# Patient Record
Sex: FEMALE | Race: WHITE | NOT HISPANIC OR LATINO | Employment: FULL TIME | URBAN - METROPOLITAN AREA
[De-identification: names, ages, dates, MRNs, and addresses within clinical notes are randomized per-mention and may not be internally consistent; named-entity substitution may affect disease eponyms.]

---

## 2020-09-30 ENCOUNTER — OFFICE VISIT (OUTPATIENT)
Dept: CARDIOLOGY | Facility: CLINIC | Age: 65
End: 2020-09-30
Payer: COMMERCIAL

## 2020-09-30 VITALS
HEIGHT: 61 IN | BODY MASS INDEX: 28.62 KG/M2 | DIASTOLIC BLOOD PRESSURE: 59 MMHG | HEART RATE: 58 BPM | SYSTOLIC BLOOD PRESSURE: 111 MMHG | WEIGHT: 151.56 LBS

## 2020-09-30 DIAGNOSIS — I49.1 PREMATURE ATRIAL CONTRACTIONS: Primary | ICD-10-CM

## 2020-09-30 DIAGNOSIS — E78.5 DYSLIPIDEMIA: ICD-10-CM

## 2020-09-30 PROCEDURE — 3008F PR BODY MASS INDEX (BMI) DOCUMENTED: ICD-10-PCS | Mod: CPTII,S$GLB,, | Performed by: INTERNAL MEDICINE

## 2020-09-30 PROCEDURE — 3008F BODY MASS INDEX DOCD: CPT | Mod: CPTII,S$GLB,, | Performed by: INTERNAL MEDICINE

## 2020-09-30 PROCEDURE — 99203 OFFICE O/P NEW LOW 30 MIN: CPT | Mod: S$GLB,,, | Performed by: INTERNAL MEDICINE

## 2020-09-30 PROCEDURE — 99999 PR PBB SHADOW E&M-NEW PATIENT-LVL III: ICD-10-PCS | Mod: PBBFAC,,, | Performed by: INTERNAL MEDICINE

## 2020-09-30 PROCEDURE — 99203 PR OFFICE/OUTPT VISIT, NEW, LEVL III, 30-44 MIN: ICD-10-PCS | Mod: S$GLB,,, | Performed by: INTERNAL MEDICINE

## 2020-09-30 PROCEDURE — 99999 PR PBB SHADOW E&M-NEW PATIENT-LVL III: CPT | Mod: PBBFAC,,, | Performed by: INTERNAL MEDICINE

## 2020-09-30 RX ORDER — ATORVASTATIN CALCIUM 20 MG/1
20 TABLET, FILM COATED ORAL DAILY
Qty: 90 TABLET | Refills: 3 | Status: SHIPPED | OUTPATIENT
Start: 2020-09-30 | End: 2021-10-21 | Stop reason: SDUPTHER

## 2020-09-30 RX ORDER — METOPROLOL SUCCINATE 50 MG/1
25 TABLET, EXTENDED RELEASE ORAL DAILY
COMMUNITY
Start: 2020-07-16 | End: 2020-09-30 | Stop reason: SDUPTHER

## 2020-09-30 RX ORDER — METOPROLOL SUCCINATE 50 MG/1
50 TABLET, EXTENDED RELEASE ORAL DAILY
Qty: 90 TABLET | Refills: 3 | Status: SHIPPED | OUTPATIENT
Start: 2020-09-30 | End: 2021-10-21 | Stop reason: SDUPTHER

## 2020-09-30 RX ORDER — ATORVASTATIN CALCIUM 20 MG/1
20 TABLET, FILM COATED ORAL DAILY
COMMUNITY
Start: 2020-07-16 | End: 2020-09-30 | Stop reason: SDUPTHER

## 2020-09-30 RX ORDER — MELATONIN 3 MG
3 CAPSULE ORAL NIGHTLY
COMMUNITY
End: 2022-11-01

## 2020-09-30 NOTE — PROGRESS NOTES
Subjective:   Patient ID:  Teresa Dnenis is a 65 y.o. female who presents for evaluation of Hyperlipidemia      HPI: Very pleasant woman who previously saw Dr. Dia at  for PACs/PVCs and dyslipidemia for about 7 years presents to establish care with [facility].    She is doing well with no new symptoms or cardiovascular complaints and no change in exercise capacity.  She denies chest discomfort, HENLEY, palpitations, PND/orthopnea, lightheadedness and syncope.    Her father had AF and  of CHF.    She only smoked in high school.    Exercise: walks for an hour at 5:30am with a friend 4-5x/week     Past Medical History:   Diagnosis Date    Premature atrial contractions     Seizure disorder        Past Surgical History:   Procedure Laterality Date     SECTION      ENDOMETRIAL ABLATION      HERNIA REPAIR      Tonsillectomy         Social History     Tobacco Use    Smoking status: Former Smoker    Smokeless tobacco: Never Used   Substance Use Topics    Alcohol use: Yes     Comment: Socially    Drug use: No       History reviewed. No pertinent family history.    Current Outpatient Medications   Medication Sig    atorvastatin (LIPITOR) 20 MG tablet Take 20 mg by mouth once daily.    b complex vitamins capsule Take 1 capsule by mouth once daily.      carbamazepine (CARBATROL) 300 MG CM12 Take 300 mg by mouth once daily.     IRON, FERROUS SULFATE, ORAL Take 65 mg by mouth once daily.      melatonin 3 mg Cap Take 3 mg by mouth nightly.    metoprolol succinate (TOPROL-XL) 50 MG 24 hr tablet Take 25 mg by mouth once daily.     No current facility-administered medications for this visit.        Review of patient's allergies indicates:  No Known Allergies    Review of Systems   Constitution: Negative.   HENT: Negative.    Eyes: Negative.    Cardiovascular: Negative.  Negative for chest pain, dyspnea on exertion, near-syncope, orthopnea and palpitations.   Respiratory: Negative.  Negative for  cough and shortness of breath.    Endocrine: Negative.    Hematologic/Lymphatic: Negative.    Skin: Negative.    Musculoskeletal: Negative.    Gastrointestinal: Negative.    Genitourinary: Negative.    Neurological: Negative.    Psychiatric/Behavioral: Negative.      Objective:   Physical Exam   Constitutional: She is oriented to person, place, and time. She appears well-developed and well-nourished.   HENT:   Head: Normocephalic and atraumatic.   Mouth/Throat: Oropharynx is clear and moist.   Eyes: Conjunctivae and EOM are normal. No scleral icterus.   Neck: Normal range of motion. Neck supple. No JVD present.   Cardiovascular: Normal rate, regular rhythm, normal heart sounds and intact distal pulses. Exam reveals no gallop and no friction rub.   No murmur heard.  Pulmonary/Chest: Effort normal and breath sounds normal. She has no wheezes. She has no rales.   Abdominal: Soft. Bowel sounds are normal. She exhibits no distension. There is no abdominal tenderness.   Musculoskeletal: Normal range of motion.         General: No edema.   Neurological: She is alert and oriented to person, place, and time.   Skin: Skin is warm and dry. No rash noted. No erythema.   Psychiatric: She has a normal mood and affect. Her behavior is normal. Judgment and thought content normal.   Vitals reviewed.      No results found for: WBC, HGB, HCT, MCV, PLT      Chemistry    No results found for: NA, K, CL, CO2, BUN, CREATININE, GLU No results found for: CALCIUM, ALKPHOS, AST, ALT, BILITOT, ESTGFRAFRICA, EGFRNONAA         No results found for: CHOL  No results found for: HDL  No results found for: LDLCALC  No results found for: TRIG  No results found for: CHOLHDL    Lab Results   Component Value Date    TSH 1.20 10/04/2010       No results found for: HGBA1C      Assessment:     1. Premature atrial contractions    2. Dyslipidemia        Plan:     Continue current medicines.    Diet/exercise goals reinforced.    Hand washing and social  distancing stressed.    F/U 12 months

## 2020-10-05 ENCOUNTER — RESEARCH ENCOUNTER (OUTPATIENT)
Dept: RESEARCH | Facility: HOSPITAL | Age: 65
End: 2020-10-05

## 2020-10-05 ENCOUNTER — RESEARCH ENCOUNTER (OUTPATIENT)
Dept: RESEARCH | Facility: HOSPITAL | Age: 65
End: 2020-10-05
Payer: COMMERCIAL

## 2020-10-05 ENCOUNTER — PATIENT MESSAGE (OUTPATIENT)
Dept: RESEARCH | Facility: HOSPITAL | Age: 65
End: 2020-10-05

## 2020-10-05 NOTE — PROGRESS NOTES
Research Study:    2015.101.C - [facility]     Status Consented   Start Date 10/5/20   Coordinator Caleb Mcmahan; Zofia Duncan; Court Feng; Samantha Tomlin; Clara Wang; Carmen Joya; Ge De Oliveira; Ascencion Natarajan; Mateo Wayne; David Real Pe; Agnes Powers; Gwen Hennessy; Marin Tripathi; Barbra Ramirez; Naima Castillo; Renea Lester; Caroline Roche; Tamera Cantrell; Jody Iverson; Isrrael Santacruz; Sherie Gonsalez       Protocol: [place]- (IRB 2015.101 PI: Tonya)  Date: 10/5/20    Subject came in today for lab draw, nasal swab and saliva collection for the COVID Biobank study (IRB 2015.101 PI: Tonya).      Patient was consented for the study on 05Oct2020. Labs were drawn at 15:58 per protocol.    Sherie Gonsalez  10/05/2020

## 2020-10-05 NOTE — PROGRESS NOTES
The person granting permission, Teresahenny Dennis was called today regarding the patient's participation in (IRB #2015.101 PI: Tonya).   The Verbal Informed Consent was read and discussed by the consenter. The following was discussed:   Types of specimens to be collected   All medical information released to researchers will be stripped of identifiers and no patient information will be given to anyone outside of this research project.    Participating in a research study is not the same as getting regular medical care and will not improve the patient's health. The purpose of a research study is to gather information.  Being in this study does not interfere with your regular medical care.   The patient does not have to participate in this study. If they do not join, their care at [facility] will not be affected.  The person granting permission was provided adequate time to ask questions regarding the scope and purpose of the study.  Permission was obtained by telephone.   The above statements were read by the person obtaining permission to the person granting permission and witnessed by Barbra KESSLER, who also confirmed the patient's consent to the study. The witness information was documented on the verbal consent form as well.  This Verbal Informed Consent process was conducted prior to initiation of any study procedures.

## 2021-03-11 ENCOUNTER — OFFICE VISIT (OUTPATIENT)
Dept: NEUROLOGY | Facility: CLINIC | Age: 66
End: 2021-03-11
Payer: COMMERCIAL

## 2021-03-11 VITALS
BODY MASS INDEX: 30.01 KG/M2 | HEART RATE: 69 BPM | DIASTOLIC BLOOD PRESSURE: 64 MMHG | WEIGHT: 158.94 LBS | HEIGHT: 61 IN | SYSTOLIC BLOOD PRESSURE: 106 MMHG

## 2021-03-11 DIAGNOSIS — G40.909 SEIZURE DISORDER: Primary | ICD-10-CM

## 2021-03-11 PROCEDURE — 1126F AMNT PAIN NOTED NONE PRSNT: CPT | Mod: S$GLB,,, | Performed by: NEUROMUSCULOSKELETAL MEDICINE & OMM

## 2021-03-11 PROCEDURE — 3008F BODY MASS INDEX DOCD: CPT | Mod: CPTII,S$GLB,, | Performed by: NEUROMUSCULOSKELETAL MEDICINE & OMM

## 2021-03-11 PROCEDURE — 99204 OFFICE O/P NEW MOD 45 MIN: CPT | Mod: S$GLB,,, | Performed by: NEUROMUSCULOSKELETAL MEDICINE & OMM

## 2021-03-11 PROCEDURE — 1126F PR PAIN SEVERITY QUANTIFIED, NO PAIN PRESENT: ICD-10-PCS | Mod: S$GLB,,, | Performed by: NEUROMUSCULOSKELETAL MEDICINE & OMM

## 2021-03-11 PROCEDURE — 99999 PR PBB SHADOW E&M-EST. PATIENT-LVL III: CPT | Mod: PBBFAC,,, | Performed by: NEUROMUSCULOSKELETAL MEDICINE & OMM

## 2021-03-11 PROCEDURE — 99204 PR OFFICE/OUTPT VISIT, NEW, LEVL IV, 45-59 MIN: ICD-10-PCS | Mod: S$GLB,,, | Performed by: NEUROMUSCULOSKELETAL MEDICINE & OMM

## 2021-03-11 PROCEDURE — 99999 PR PBB SHADOW E&M-EST. PATIENT-LVL III: ICD-10-PCS | Mod: PBBFAC,,, | Performed by: NEUROMUSCULOSKELETAL MEDICINE & OMM

## 2021-03-11 PROCEDURE — 3008F PR BODY MASS INDEX (BMI) DOCUMENTED: ICD-10-PCS | Mod: CPTII,S$GLB,, | Performed by: NEUROMUSCULOSKELETAL MEDICINE & OMM

## 2021-03-11 RX ORDER — CARBAMAZEPINE 300 MG/1
300 CAPSULE, EXTENDED RELEASE ORAL 2 TIMES DAILY
Qty: 60 CAPSULE | Refills: 5 | Status: SHIPPED | OUTPATIENT
Start: 2021-03-11 | End: 2021-07-07

## 2021-03-11 RX ORDER — OMEPRAZOLE 20 MG/1
20 TABLET, DELAYED RELEASE ORAL DAILY
COMMUNITY
End: 2023-09-07

## 2021-09-03 ENCOUNTER — PATIENT MESSAGE (OUTPATIENT)
Dept: NEUROLOGY | Facility: CLINIC | Age: 66
End: 2021-09-03

## 2021-10-21 ENCOUNTER — OFFICE VISIT (OUTPATIENT)
Dept: CARDIOLOGY | Facility: CLINIC | Age: 66
End: 2021-10-21
Payer: COMMERCIAL

## 2021-10-21 VITALS
BODY MASS INDEX: 29.84 KG/M2 | HEART RATE: 63 BPM | WEIGHT: 158.06 LBS | HEIGHT: 61 IN | SYSTOLIC BLOOD PRESSURE: 129 MMHG | DIASTOLIC BLOOD PRESSURE: 71 MMHG

## 2021-10-21 DIAGNOSIS — I49.1 PREMATURE ATRIAL CONTRACTIONS: ICD-10-CM

## 2021-10-21 DIAGNOSIS — E78.00 PURE HYPERCHOLESTEROLEMIA: Primary | ICD-10-CM

## 2021-10-21 PROBLEM — E78.5 HYPERLIPIDEMIA: Chronic | Status: ACTIVE | Noted: 2021-10-21

## 2021-10-21 PROBLEM — E78.5 HYPERLIPIDEMIA: Status: ACTIVE | Noted: 2021-10-21

## 2021-10-21 PROCEDURE — 99213 PR OFFICE/OUTPT VISIT, EST, LEVL III, 20-29 MIN: ICD-10-PCS | Mod: S$GLB,,, | Performed by: INTERNAL MEDICINE

## 2021-10-21 PROCEDURE — 1160F PR REVIEW ALL MEDS BY PRESCRIBER/CLIN PHARMACIST DOCUMENTED: ICD-10-PCS | Mod: CPTII,S$GLB,, | Performed by: INTERNAL MEDICINE

## 2021-10-21 PROCEDURE — 99213 OFFICE O/P EST LOW 20 MIN: CPT | Mod: S$GLB,,, | Performed by: INTERNAL MEDICINE

## 2021-10-21 PROCEDURE — 1159F MED LIST DOCD IN RCRD: CPT | Mod: CPTII,S$GLB,, | Performed by: INTERNAL MEDICINE

## 2021-10-21 PROCEDURE — 99999 PR PBB SHADOW E&M-EST. PATIENT-LVL III: CPT | Mod: PBBFAC,,, | Performed by: INTERNAL MEDICINE

## 2021-10-21 PROCEDURE — 3008F PR BODY MASS INDEX (BMI) DOCUMENTED: ICD-10-PCS | Mod: CPTII,S$GLB,, | Performed by: INTERNAL MEDICINE

## 2021-10-21 PROCEDURE — 3074F SYST BP LT 130 MM HG: CPT | Mod: CPTII,S$GLB,, | Performed by: INTERNAL MEDICINE

## 2021-10-21 PROCEDURE — 99999 PR PBB SHADOW E&M-EST. PATIENT-LVL III: ICD-10-PCS | Mod: PBBFAC,,, | Performed by: INTERNAL MEDICINE

## 2021-10-21 PROCEDURE — 3074F PR MOST RECENT SYSTOLIC BLOOD PRESSURE < 130 MM HG: ICD-10-PCS | Mod: CPTII,S$GLB,, | Performed by: INTERNAL MEDICINE

## 2021-10-21 PROCEDURE — 3078F DIAST BP <80 MM HG: CPT | Mod: CPTII,S$GLB,, | Performed by: INTERNAL MEDICINE

## 2021-10-21 PROCEDURE — 3008F BODY MASS INDEX DOCD: CPT | Mod: CPTII,S$GLB,, | Performed by: INTERNAL MEDICINE

## 2021-10-21 PROCEDURE — 1160F RVW MEDS BY RX/DR IN RCRD: CPT | Mod: CPTII,S$GLB,, | Performed by: INTERNAL MEDICINE

## 2021-10-21 PROCEDURE — 3078F PR MOST RECENT DIASTOLIC BLOOD PRESSURE < 80 MM HG: ICD-10-PCS | Mod: CPTII,S$GLB,, | Performed by: INTERNAL MEDICINE

## 2021-10-21 PROCEDURE — 1159F PR MEDICATION LIST DOCUMENTED IN MEDICAL RECORD: ICD-10-PCS | Mod: CPTII,S$GLB,, | Performed by: INTERNAL MEDICINE

## 2021-10-21 RX ORDER — ATORVASTATIN CALCIUM 20 MG/1
20 TABLET, FILM COATED ORAL DAILY
Qty: 90 TABLET | Refills: 3 | Status: SHIPPED | OUTPATIENT
Start: 2021-10-21 | End: 2022-10-14

## 2021-10-21 RX ORDER — METOPROLOL SUCCINATE 50 MG/1
50 TABLET, EXTENDED RELEASE ORAL DAILY
Qty: 90 TABLET | Refills: 3 | Status: SHIPPED | OUTPATIENT
Start: 2021-10-21 | End: 2022-10-14

## 2021-12-28 LAB
ALBUMIN SERPL-MCNC: 4.4 G/DL (ref 3.8–4.8)
ALBUMIN/GLOB SERPL: 1.8 {RATIO} (ref 1.2–2.2)
ALP SERPL-CCNC: 104 IU/L (ref 44–121)
ALT SERPL-CCNC: 14 IU/L (ref 0–32)
AST SERPL-CCNC: 16 IU/L (ref 0–40)
BASOPHILS # BLD AUTO: 0 X10E3/UL (ref 0–0.2)
BASOPHILS NFR BLD AUTO: 1 %
BILIRUB SERPL-MCNC: 0.3 MG/DL (ref 0–1.2)
BUN SERPL-MCNC: 13 MG/DL (ref 8–27)
BUN/CREAT SERPL: 19 (ref 12–28)
CALCIUM SERPL-MCNC: 9.4 MG/DL (ref 8.7–10.3)
CHLORIDE SERPL-SCNC: 102 MMOL/L (ref 96–106)
CHOLEST SERPL-MCNC: 210 MG/DL (ref 100–199)
CO2 SERPL-SCNC: 21 MMOL/L (ref 20–29)
CREAT SERPL-MCNC: 0.68 MG/DL (ref 0.57–1)
EOSINOPHIL # BLD AUTO: 0.1 X10E3/UL (ref 0–0.4)
EOSINOPHIL NFR BLD AUTO: 2 %
ERYTHROCYTE [DISTWIDTH] IN BLOOD BY AUTOMATED COUNT: 11.9 % (ref 11.7–15.4)
GLOBULIN SER CALC-MCNC: 2.4 G/DL (ref 1.5–4.5)
GLUCOSE SERPL-MCNC: 97 MG/DL (ref 65–99)
HCT VFR BLD AUTO: 36.2 % (ref 34–46.6)
HDLC SERPL-MCNC: 88 MG/DL
HGB BLD-MCNC: 12.3 G/DL (ref 11.1–15.9)
IMM GRANULOCYTES # BLD AUTO: 0 X10E3/UL (ref 0–0.1)
IMM GRANULOCYTES NFR BLD AUTO: 0 %
LDLC SERPL CALC-MCNC: 106 MG/DL (ref 0–99)
LYMPHOCYTES # BLD AUTO: 1.5 X10E3/UL (ref 0.7–3.1)
LYMPHOCYTES NFR BLD AUTO: 31 %
MAGNESIUM SERPL-MCNC: 2.1 MG/DL (ref 1.6–2.3)
MCH RBC QN AUTO: 32.6 PG (ref 26.6–33)
MCHC RBC AUTO-ENTMCNC: 34 G/DL (ref 31.5–35.7)
MCV RBC AUTO: 96 FL (ref 79–97)
MONOCYTES # BLD AUTO: 0.4 X10E3/UL (ref 0.1–0.9)
MONOCYTES NFR BLD AUTO: 9 %
NEUTROPHILS # BLD AUTO: 2.6 X10E3/UL (ref 1.4–7)
NEUTROPHILS NFR BLD AUTO: 57 %
PLATELET # BLD AUTO: 263 X10E3/UL (ref 150–450)
POTASSIUM SERPL-SCNC: 4.4 MMOL/L (ref 3.5–5.2)
PROT SERPL-MCNC: 6.8 G/DL (ref 6–8.5)
RBC # BLD AUTO: 3.77 X10E6/UL (ref 3.77–5.28)
SODIUM SERPL-SCNC: 139 MMOL/L (ref 134–144)
TRIGL SERPL-MCNC: 94 MG/DL (ref 0–149)
VLDLC SERPL CALC-MCNC: 16 MG/DL (ref 5–40)
WBC # BLD AUTO: 4.7 X10E3/UL (ref 3.4–10.8)

## 2022-01-01 ENCOUNTER — LAB VISIT (OUTPATIENT)
Dept: PRIMARY CARE CLINIC | Facility: OTHER | Age: 67
End: 2022-01-01
Payer: COMMERCIAL

## 2022-01-01 DIAGNOSIS — Z20.822 ENCOUNTER FOR LABORATORY TESTING FOR COVID-19 VIRUS: ICD-10-CM

## 2022-01-01 PROCEDURE — U0003 INFECTIOUS AGENT DETECTION BY NUCLEIC ACID (DNA OR RNA); SEVERE ACUTE RESPIRATORY SYNDROME CORONAVIRUS 2 (SARS-COV-2) (CORONAVIRUS DISEASE [COVID-19]), AMPLIFIED PROBE TECHNIQUE, MAKING USE OF HIGH THROUGHPUT TECHNOLOGIES AS DESCRIBED BY CMS-2020-01-R: HCPCS | Performed by: INTERNAL MEDICINE

## 2022-01-01 NOTE — PROGRESS NOTES
Pt arrived for Covid testing  Swab collected using [facility] and CDC guidelines  Pt given info on receiving results .

## 2022-01-05 LAB
SARS-COV-2 RNA RESP QL NAA+PROBE: DETECTED
SARS-COV-2- CYCLE NUMBER: 9

## 2022-10-06 ENCOUNTER — PATIENT MESSAGE (OUTPATIENT)
Dept: RESEARCH | Facility: HOSPITAL | Age: 67
End: 2022-10-06
Payer: COMMERCIAL

## 2022-11-01 ENCOUNTER — OFFICE VISIT (OUTPATIENT)
Dept: CARDIOLOGY | Facility: CLINIC | Age: 67
End: 2022-11-01
Payer: COMMERCIAL

## 2022-11-01 VITALS
DIASTOLIC BLOOD PRESSURE: 78 MMHG | WEIGHT: 153.44 LBS | BODY MASS INDEX: 28.97 KG/M2 | SYSTOLIC BLOOD PRESSURE: 127 MMHG | HEIGHT: 61 IN | HEART RATE: 74 BPM

## 2022-11-01 DIAGNOSIS — I49.1 PREMATURE ATRIAL CONTRACTIONS: Chronic | ICD-10-CM

## 2022-11-01 DIAGNOSIS — I49.1 PREMATURE ATRIAL CONTRACTIONS: Primary | Chronic | ICD-10-CM

## 2022-11-01 DIAGNOSIS — E78.00 PURE HYPERCHOLESTEROLEMIA: Primary | Chronic | ICD-10-CM

## 2022-11-01 PROCEDURE — 1160F RVW MEDS BY RX/DR IN RCRD: CPT | Mod: CPTII,S$GLB,, | Performed by: INTERNAL MEDICINE

## 2022-11-01 PROCEDURE — 1160F PR REVIEW ALL MEDS BY PRESCRIBER/CLIN PHARMACIST DOCUMENTED: ICD-10-PCS | Mod: CPTII,S$GLB,, | Performed by: INTERNAL MEDICINE

## 2022-11-01 PROCEDURE — 3078F DIAST BP <80 MM HG: CPT | Mod: CPTII,S$GLB,, | Performed by: INTERNAL MEDICINE

## 2022-11-01 PROCEDURE — 93000 ELECTROCARDIOGRAM COMPLETE: CPT | Mod: S$GLB,,, | Performed by: INTERNAL MEDICINE

## 2022-11-01 PROCEDURE — 1101F PR PT FALLS ASSESS DOC 0-1 FALLS W/OUT INJ PAST YR: ICD-10-PCS | Mod: CPTII,S$GLB,, | Performed by: INTERNAL MEDICINE

## 2022-11-01 PROCEDURE — 3008F PR BODY MASS INDEX (BMI) DOCUMENTED: ICD-10-PCS | Mod: CPTII,S$GLB,, | Performed by: INTERNAL MEDICINE

## 2022-11-01 PROCEDURE — 1159F MED LIST DOCD IN RCRD: CPT | Mod: CPTII,S$GLB,, | Performed by: INTERNAL MEDICINE

## 2022-11-01 PROCEDURE — 3078F PR MOST RECENT DIASTOLIC BLOOD PRESSURE < 80 MM HG: ICD-10-PCS | Mod: CPTII,S$GLB,, | Performed by: INTERNAL MEDICINE

## 2022-11-01 PROCEDURE — 3288F FALL RISK ASSESSMENT DOCD: CPT | Mod: CPTII,S$GLB,, | Performed by: INTERNAL MEDICINE

## 2022-11-01 PROCEDURE — 99999 PR PBB SHADOW E&M-EST. PATIENT-LVL III: ICD-10-PCS | Mod: PBBFAC,,, | Performed by: INTERNAL MEDICINE

## 2022-11-01 PROCEDURE — 3008F BODY MASS INDEX DOCD: CPT | Mod: CPTII,S$GLB,, | Performed by: INTERNAL MEDICINE

## 2022-11-01 PROCEDURE — 99999 PR PBB SHADOW E&M-EST. PATIENT-LVL III: CPT | Mod: PBBFAC,,, | Performed by: INTERNAL MEDICINE

## 2022-11-01 PROCEDURE — 93000 EKG 12-LEAD: ICD-10-PCS | Mod: S$GLB,,, | Performed by: INTERNAL MEDICINE

## 2022-11-01 PROCEDURE — 99213 OFFICE O/P EST LOW 20 MIN: CPT | Mod: S$GLB,,, | Performed by: INTERNAL MEDICINE

## 2022-11-01 PROCEDURE — 3288F PR FALLS RISK ASSESSMENT DOCUMENTED: ICD-10-PCS | Mod: CPTII,S$GLB,, | Performed by: INTERNAL MEDICINE

## 2022-11-01 PROCEDURE — 99213 PR OFFICE/OUTPT VISIT, EST, LEVL III, 20-29 MIN: ICD-10-PCS | Mod: S$GLB,,, | Performed by: INTERNAL MEDICINE

## 2022-11-01 PROCEDURE — 1101F PT FALLS ASSESS-DOCD LE1/YR: CPT | Mod: CPTII,S$GLB,, | Performed by: INTERNAL MEDICINE

## 2022-11-01 PROCEDURE — 3074F SYST BP LT 130 MM HG: CPT | Mod: CPTII,S$GLB,, | Performed by: INTERNAL MEDICINE

## 2022-11-01 PROCEDURE — 3074F PR MOST RECENT SYSTOLIC BLOOD PRESSURE < 130 MM HG: ICD-10-PCS | Mod: CPTII,S$GLB,, | Performed by: INTERNAL MEDICINE

## 2022-11-01 PROCEDURE — 1159F PR MEDICATION LIST DOCUMENTED IN MEDICAL RECORD: ICD-10-PCS | Mod: CPTII,S$GLB,, | Performed by: INTERNAL MEDICINE

## 2022-11-01 NOTE — PROGRESS NOTES
Subjective:   2022     Patient ID:  Teresa Dennis is a 67 y.o. female who presents for evaulation of No chief complaint on file.      Patient with PACs/PVCs and dyslipidemia  here for cardiac follow-up.  She has no complaints of chest pains tightness, PND or orthopnea.  She does have dyspnea on exertion.  She has not been walking regularly.  She has not been having palpitations.    She does have positive family history for congestive heart failure.  Hypercholesterolemia is treated with moderate dose statin therapy.  Lipid profile in 2021 showed a total cholesterol 210, HDL 88, LDL one hundred six, triglycerides 94.    She is doing well with no new symptoms or cardiovascular complaints and no change in exercise capacity.  She denies chest discomfort, HENLEY, palpitations, PND/orthopnea, lightheadedness and syncope.     Her father had AF and  of CHF.     She only smoked in high school.     Response after blood work in 2021:  Blood work looks great.  Your total cholesterol is mildly elevated, but you have a high HDL and a low triglyceride.  Your LDL is 106. I think that continuing you on moderate dose statin therapy with atorvastatin as currently prescribed is adequate.     Please let me know if you have any questions.         Past Medical History:   Diagnosis Date    Premature atrial contractions     Seizure disorder        Review of patient's allergies indicates:  No Known Allergies      Current Outpatient Medications:     atorvastatin (LIPITOR) 20 MG tablet, TAKE 1 TABLET BY MOUTH EVERY DAY, Disp: 90 tablet, Rfl: 3    carBAMazepine (CARBATROL) 300 MG CM12, TAKE 1 CAPSULE BY MOUTH TWICE A DAY, Disp: 180 capsule, Rfl: 1    metoprolol succinate (TOPROL-XL) 50 MG 24 hr tablet, TAKE 1 TABLET BY MOUTH EVERY DAY, Disp: 90 tablet, Rfl: 3    omeprazole (PRILOSEC OTC) 20 MG tablet, Take 20 mg by mouth once daily., Disp: , Rfl:      Objective:   Review of Systems   Cardiovascular:  Negative  for chest pain, claudication, cyanosis, dyspnea on exertion, irregular heartbeat, leg swelling, near-syncope, orthopnea, palpitations, paroxysmal nocturnal dyspnea and syncope.     Vitals:    11/01/22 1522   BP: 127/78   Pulse: 74         Physical Exam  Vitals reviewed.   Constitutional:       General: She is not in acute distress.     Appearance: She is well-developed.   HENT:      Head: Normocephalic and atraumatic.      Nose: Nose normal.   Eyes:      Conjunctiva/sclera: Conjunctivae normal.      Pupils: Pupils are equal, round, and reactive to light.   Neck:      Vascular: No JVD.   Cardiovascular:      Rate and Rhythm: Normal rate and regular rhythm.      Pulses: Intact distal pulses.      Heart sounds: Normal heart sounds. No murmur heard.    No friction rub. No gallop.   Pulmonary:      Effort: Pulmonary effort is normal. No respiratory distress.      Breath sounds: Normal breath sounds. No wheezing or rales.   Chest:      Chest wall: No tenderness.   Abdominal:      General: Bowel sounds are normal. There is no distension.      Palpations: Abdomen is soft.      Tenderness: There is no abdominal tenderness.   Musculoskeletal:         General: No tenderness or deformity. Normal range of motion.      Cervical back: Normal range of motion and neck supple.   Skin:     General: Skin is warm and dry.      Findings: No erythema or rash.   Neurological:      Mental Status: She is alert and oriented to person, place, and time.      Cranial Nerves: No cranial nerve deficit.      Motor: No abnormal muscle tone.      Coordination: Coordination normal.   Psychiatric:         Behavior: Behavior normal.         Thought Content: Thought content normal.         Judgment: Judgment normal.     EKG shows sinus rhythm, sinus arrhythmia, normal EKG                  Assessment and Plan:     Pure hypercholesterolemia  Comments:  Well controlled on moderate dose statin therapy    Premature atrial contractions  Comments:  Remains  asymptomatic  Orders:  -     IN OFFICE EKG 12-LEAD (to [place])    Patient currently doing well.  No cardiac symptoms.  No test will be ordered except laboratory work as noted above.  She can return yearly for cardiac follow-up, but also could be seen in the family practice or Internal Medicine Clinics unless she would develop    No follow-ups on file.

## 2023-03-20 ENCOUNTER — OFFICE VISIT (OUTPATIENT)
Dept: OTOLARYNGOLOGY | Facility: CLINIC | Age: 68
End: 2023-03-20
Payer: COMMERCIAL

## 2023-03-20 DIAGNOSIS — H61.22 IMPACTED CERUMEN OF LEFT EAR: Primary | ICD-10-CM

## 2023-03-20 PROCEDURE — 99499 NO LOS: ICD-10-PCS | Mod: S$GLB,,, | Performed by: PHYSICIAN ASSISTANT

## 2023-03-20 PROCEDURE — 69210 REMOVE IMPACTED EAR WAX UNI: CPT | Mod: S$GLB,,, | Performed by: PHYSICIAN ASSISTANT

## 2023-03-20 PROCEDURE — 99499 UNLISTED E&M SERVICE: CPT | Mod: S$GLB,,, | Performed by: PHYSICIAN ASSISTANT

## 2023-03-20 PROCEDURE — 69210 EAR CERUMEN REMOVAL: ICD-10-PCS | Mod: S$GLB,,, | Performed by: PHYSICIAN ASSISTANT

## 2023-03-20 NOTE — PROCEDURES
Ear Cerumen Removal    Date/Time: 3/20/2023 1:00 PM  Performed by: Everardo Zamora PA-C  Authorized by: Everardo Zamora PA-C     Consent Done?:  Yes (Verbal)    Local anesthetic:  None  Location details:  Left ear  Procedure type: curette    Cerumen  Removal Results:  Cerumen completely removed  Patient tolerance:  Patient tolerated the procedure well with no immediate complications     Procedure Note:    The patient was brought to the minor procedure room and placed under the operating microscope of the left ear canal which was cleaned of ceruminous debris. Using a combination of suction, curettes and cup forceps the patient's cerumen impaction was removed. The tympanic membrane was evaluated and was unremarkable. The patient tolerated the procedure well. There were no complications.      
.

## 2023-08-25 ENCOUNTER — PATIENT MESSAGE (OUTPATIENT)
Dept: CARDIOLOGY | Facility: CLINIC | Age: 68
End: 2023-08-25
Payer: COMMERCIAL

## 2023-08-25 ENCOUNTER — TELEPHONE (OUTPATIENT)
Dept: CARDIOLOGY | Facility: CLINIC | Age: 68
End: 2023-08-25
Payer: COMMERCIAL

## 2023-08-25 DIAGNOSIS — R00.1 SYMPTOMATIC BRADYCARDIA: ICD-10-CM

## 2023-08-25 NOTE — TELEPHONE ENCOUNTER
Called pt to schedule 48 hour Holter and OV to follow as per Dr. Comer.   Patient verbalized understanding

## 2023-08-29 ENCOUNTER — HOSPITAL ENCOUNTER (OUTPATIENT)
Dept: CARDIOLOGY | Facility: HOSPITAL | Age: 68
Discharge: HOME | End: 2023-08-29
Attending: INTERNAL MEDICINE
Payer: COMMERCIAL

## 2023-08-29 ENCOUNTER — PATIENT MESSAGE (OUTPATIENT)
Dept: CARDIOLOGY | Facility: CLINIC | Age: 68
End: 2023-08-29
Payer: COMMERCIAL

## 2023-08-29 DIAGNOSIS — R00.1 SYMPTOMATIC BRADYCARDIA: ICD-10-CM

## 2023-08-29 PROCEDURE — 93227 XTRNL ECG REC<48 HR R&I: CPT | Mod: ,,, | Performed by: INTERNAL MEDICINE

## 2023-08-29 PROCEDURE — 93226 XTRNL ECG REC<48 HR SCAN A/R: CPT

## 2023-08-29 PROCEDURE — 93227 HOLTER MONITOR - 48 HOUR (CUPID ONLY): ICD-10-PCS | Mod: ,,, | Performed by: INTERNAL MEDICINE

## 2023-09-05 ENCOUNTER — PATIENT MESSAGE (OUTPATIENT)
Dept: CARDIOLOGY | Facility: CLINIC | Age: 68
End: 2023-09-05
Payer: COMMERCIAL

## 2023-09-07 ENCOUNTER — HOSPITAL ENCOUNTER (OUTPATIENT)
Facility: HOSPITAL | Age: 68
Discharge: HOME | End: 2023-09-08
Attending: EMERGENCY MEDICINE | Admitting: EMERGENCY MEDICINE
Payer: COMMERCIAL

## 2023-09-07 DIAGNOSIS — R00.2 PALPITATIONS: ICD-10-CM

## 2023-09-07 DIAGNOSIS — R00.1 BRADYCARDIA: ICD-10-CM

## 2023-09-07 DIAGNOSIS — R00.1 SYMPTOMATIC BRADYCARDIA: Primary | ICD-10-CM

## 2023-09-07 DIAGNOSIS — R07.9 CHEST PAIN: ICD-10-CM

## 2023-09-07 LAB
ALBUMIN SERPL BCP-MCNC: 3.8 G/DL (ref 3.5–5.2)
ALP SERPL-CCNC: 84 U/L (ref 55–135)
ALT SERPL W/O P-5'-P-CCNC: 16 U/L (ref 10–44)
ANION GAP SERPL CALC-SCNC: 11 MMOL/L (ref 8–16)
ASCENDING AORTA: 2.96 CM
AST SERPL-CCNC: 17 U/L (ref 10–40)
AV INDEX (PROSTH): 0.82
AV MEAN GRADIENT: 2 MMHG
AV PEAK GRADIENT: 4 MMHG
AV VALVE AREA BY VELOCITY RATIO: 3.67 CM²
AV VALVE AREA: 3.66 CM²
AV VELOCITY RATIO: 0.82
BASOPHILS # BLD AUTO: 0.03 K/UL (ref 0–0.2)
BASOPHILS NFR BLD: 0.7 % (ref 0–1.9)
BILIRUB SERPL-MCNC: 0.4 MG/DL (ref 0.1–1)
BILIRUB UR QL STRIP: NEGATIVE
BNP SERPL-MCNC: 202 PG/ML (ref 0–99)
BSA FOR ECHO PROCEDURE: 1.73 M2
BUN SERPL-MCNC: 11 MG/DL (ref 8–23)
CALCIUM SERPL-MCNC: 9.4 MG/DL (ref 8.7–10.5)
CHLORIDE SERPL-SCNC: 107 MMOL/L (ref 95–110)
CLARITY UR REFRACT.AUTO: CLEAR
CO2 SERPL-SCNC: 22 MMOL/L (ref 23–29)
COLOR UR AUTO: YELLOW
CREAT SERPL-MCNC: 0.7 MG/DL (ref 0.5–1.4)
CV ECHO LV RWT: 0.52 CM
D DIMER PPP IA.FEU-MCNC: 0.25 MG/L FEU
DIFFERENTIAL METHOD: ABNORMAL
DOP CALC AO PEAK VEL: 0.97 M/S
DOP CALC AO VTI: 23.6 CM
DOP CALC LVOT AREA: 4.4 CM2
DOP CALC LVOT DIAMETER: 2.38 CM
DOP CALC LVOT PEAK VEL: 0.8 M/S
DOP CALC LVOT STROKE VOLUME: 86.26 CM3
DOP CALCLVOT PEAK VEL VTI: 19.4 CM
ECHO LV POSTERIOR WALL: 1.12 CM (ref 0.6–1.1)
EOSINOPHIL # BLD AUTO: 0.1 K/UL (ref 0–0.5)
EOSINOPHIL NFR BLD: 2.4 % (ref 0–8)
ERYTHROCYTE [DISTWIDTH] IN BLOOD BY AUTOMATED COUNT: 12.9 % (ref 11.5–14.5)
EST. GFR  (NO RACE VARIABLE): >60 ML/MIN/1.73 M^2
FRACTIONAL SHORTENING: 29 % (ref 28–44)
GLUCOSE SERPL-MCNC: 91 MG/DL (ref 70–110)
GLUCOSE UR QL STRIP: NEGATIVE
HCT VFR BLD AUTO: 37.4 % (ref 37–48.5)
HCV AB SERPL QL IA: NORMAL
HGB BLD-MCNC: 12.5 G/DL (ref 12–16)
HGB UR QL STRIP: NEGATIVE
HIV 1+2 AB+HIV1 P24 AG SERPL QL IA: NORMAL
IMM GRANULOCYTES # BLD AUTO: 0.01 K/UL (ref 0–0.04)
IMM GRANULOCYTES NFR BLD AUTO: 0.2 % (ref 0–0.5)
INTERVENTRICULAR SEPTUM: 0.78 CM (ref 0.6–1.1)
KETONES UR QL STRIP: NEGATIVE
LA MAJOR: 4.76 CM
LA MINOR: 5.11 CM
LA WIDTH: 4.46 CM
LEFT ATRIUM SIZE: 4.01 CM
LEFT ATRIUM VOLUME INDEX MOD: 35.8 ML/M2
LEFT ATRIUM VOLUME INDEX: 44.3 ML/M2
LEFT ATRIUM VOLUME MOD: 60.58 CM3
LEFT ATRIUM VOLUME: 74.93 CM3
LEFT INTERNAL DIMENSION IN SYSTOLE: 3.07 CM (ref 2.1–4)
LEFT VENTRICLE DIASTOLIC VOLUME INDEX: 49.75 ML/M2
LEFT VENTRICLE DIASTOLIC VOLUME: 84.08 ML
LEFT VENTRICLE MASS INDEX: 79 G/M2
LEFT VENTRICLE SYSTOLIC VOLUME INDEX: 21.9 ML/M2
LEFT VENTRICLE SYSTOLIC VOLUME: 36.97 ML
LEFT VENTRICULAR INTERNAL DIMENSION IN DIASTOLE: 4.32 CM (ref 3.5–6)
LEFT VENTRICULAR MASS: 133.74 G
LEUKOCYTE ESTERASE UR QL STRIP: NEGATIVE
LYMPHOCYTES # BLD AUTO: 1.3 K/UL (ref 1–4.8)
LYMPHOCYTES NFR BLD: 30 % (ref 18–48)
MCH RBC QN AUTO: 33 PG (ref 27–31)
MCHC RBC AUTO-ENTMCNC: 33.4 G/DL (ref 32–36)
MCV RBC AUTO: 99 FL (ref 82–98)
MONOCYTES # BLD AUTO: 0.5 K/UL (ref 0.3–1)
MONOCYTES NFR BLD: 10.6 % (ref 4–15)
MV A" WAVE DURATION": 14.84 MSEC
NEUTROPHILS # BLD AUTO: 2.4 K/UL (ref 1.8–7.7)
NEUTROPHILS NFR BLD: 56.1 % (ref 38–73)
NITRITE UR QL STRIP: NEGATIVE
NRBC BLD-RTO: 0 /100 WBC
PH UR STRIP: 7 [PH] (ref 5–8)
PISA TR MAX VEL: 2.69 M/S
PLATELET # BLD AUTO: 229 K/UL (ref 150–450)
PMV BLD AUTO: 11 FL (ref 9.2–12.9)
POC CARDIAC TROPONIN I: 0 NG/ML (ref 0–0.08)
POC CARDIAC TROPONIN I: 0.01 NG/ML (ref 0–0.08)
POTASSIUM SERPL-SCNC: 4.4 MMOL/L (ref 3.5–5.1)
PROT SERPL-MCNC: 6.8 G/DL (ref 6–8.4)
PROT UR QL STRIP: NEGATIVE
PULM VEIN S/D RATIO: 0.78
PV PEAK D VEL: 0.68 M/S
PV PEAK S VEL: 0.53 M/S
RA MAJOR: 4.25 CM
RA PRESSURE ESTIMATED: 3 MMHG
RA WIDTH: 3.21 CM
RBC # BLD AUTO: 3.79 M/UL (ref 4–5.4)
RIGHT VENTRICULAR END-DIASTOLIC DIMENSION: 3.25 CM
RV TB RVSP: 6 MMHG
SAMPLE: NORMAL
SAMPLE: NORMAL
SINUS: 3.31 CM
SODIUM SERPL-SCNC: 140 MMOL/L (ref 136–145)
SP GR UR STRIP: 1.01 (ref 1–1.03)
STJ: 2.55 CM
TDI LATERAL: 0.04 M/S
TDI SEPTAL: 0.08 M/S
TDI: 0.06 M/S
TR MAX PG: 29 MMHG
TRICUSPID ANNULAR PLANE SYSTOLIC EXCURSION: 1.95 CM
TROPONIN I SERPL DL<=0.01 NG/ML-MCNC: 0.01 NG/ML (ref 0–0.03)
TROPONIN I SERPL DL<=0.01 NG/ML-MCNC: <0.006 NG/ML (ref 0–0.03)
TROPONIN I SERPL DL<=0.01 NG/ML-MCNC: <0.006 NG/ML (ref 0–0.03)
TSH SERPL DL<=0.005 MIU/L-ACNC: 0.66 UIU/ML (ref 0.4–4)
TV REST PULMONARY ARTERY PRESSURE: 32 MMHG
URN SPEC COLLECT METH UR: NORMAL
WBC # BLD AUTO: 4.24 K/UL (ref 3.9–12.7)
Z-SCORE OF LEFT VENTRICULAR DIMENSION IN END DIASTOLE: -0.86
Z-SCORE OF LEFT VENTRICULAR DIMENSION IN END SYSTOLE: 0.41

## 2023-09-07 PROCEDURE — 99213 PR OFFICE/OUTPT VISIT, EST, LEVL III, 20-29 MIN: ICD-10-PCS | Mod: 25,,, | Performed by: INTERNAL MEDICINE

## 2023-09-07 PROCEDURE — 36415 COLL VENOUS BLD VENIPUNCTURE: CPT

## 2023-09-07 PROCEDURE — 81003 URINALYSIS AUTO W/O SCOPE: CPT | Performed by: EMERGENCY MEDICINE

## 2023-09-07 PROCEDURE — 84443 ASSAY THYROID STIM HORMONE: CPT | Performed by: EMERGENCY MEDICINE

## 2023-09-07 PROCEDURE — 93010 EKG 12-LEAD: ICD-10-PCS | Mod: 77,,, | Performed by: INTERNAL MEDICINE

## 2023-09-07 PROCEDURE — 87389 HIV-1 AG W/HIV-1&-2 AB AG IA: CPT | Performed by: PHYSICIAN ASSISTANT

## 2023-09-07 PROCEDURE — G0378 HOSPITAL OBSERVATION PER HR: HCPCS

## 2023-09-07 PROCEDURE — 93005 ELECTROCARDIOGRAM TRACING: CPT

## 2023-09-07 PROCEDURE — 99223 1ST HOSP IP/OBS HIGH 75: CPT | Mod: ,,,

## 2023-09-07 PROCEDURE — 84484 ASSAY OF TROPONIN QUANT: CPT

## 2023-09-07 PROCEDURE — 99285 EMERGENCY DEPT VISIT HI MDM: CPT | Mod: 25

## 2023-09-07 PROCEDURE — 84484 ASSAY OF TROPONIN QUANT: CPT | Mod: 91 | Performed by: EMERGENCY MEDICINE

## 2023-09-07 PROCEDURE — 85379 FIBRIN DEGRADATION QUANT: CPT | Performed by: EMERGENCY MEDICINE

## 2023-09-07 PROCEDURE — 25000003 PHARM REV CODE 250: Performed by: EMERGENCY MEDICINE

## 2023-09-07 PROCEDURE — 93010 ELECTROCARDIOGRAM REPORT: CPT | Mod: 77,,, | Performed by: INTERNAL MEDICINE

## 2023-09-07 PROCEDURE — 93010 ELECTROCARDIOGRAM REPORT: CPT | Mod: ,,, | Performed by: INTERNAL MEDICINE

## 2023-09-07 PROCEDURE — 84484 ASSAY OF TROPONIN QUANT: CPT | Mod: 91

## 2023-09-07 PROCEDURE — 80053 COMPREHEN METABOLIC PANEL: CPT | Performed by: EMERGENCY MEDICINE

## 2023-09-07 PROCEDURE — 99213 OFFICE O/P EST LOW 20 MIN: CPT | Mod: 25,,, | Performed by: INTERNAL MEDICINE

## 2023-09-07 PROCEDURE — 85025 COMPLETE CBC W/AUTO DIFF WBC: CPT | Performed by: EMERGENCY MEDICINE

## 2023-09-07 PROCEDURE — 99223 PR INITIAL HOSPITAL CARE,LEVL III: ICD-10-PCS | Mod: ,,,

## 2023-09-07 PROCEDURE — 93010 ELECTROCARDIOGRAM REPORT: CPT | Mod: 76,,, | Performed by: INTERNAL MEDICINE

## 2023-09-07 PROCEDURE — 93010 EKG 12-LEAD: ICD-10-PCS | Mod: 76,,, | Performed by: INTERNAL MEDICINE

## 2023-09-07 PROCEDURE — 86803 HEPATITIS C AB TEST: CPT | Performed by: PHYSICIAN ASSISTANT

## 2023-09-07 PROCEDURE — 83880 ASSAY OF NATRIURETIC PEPTIDE: CPT | Performed by: EMERGENCY MEDICINE

## 2023-09-07 RX ORDER — CETIRIZINE HYDROCHLORIDE 10 MG/1
10 TABLET ORAL DAILY PRN
Status: DISCONTINUED | OUTPATIENT
Start: 2023-09-07 | End: 2023-09-08 | Stop reason: HOSPADM

## 2023-09-07 RX ORDER — SODIUM CHLORIDE 0.9 % (FLUSH) 0.9 %
10 SYRINGE (ML) INJECTION
Status: DISCONTINUED | OUTPATIENT
Start: 2023-09-07 | End: 2023-09-08 | Stop reason: HOSPADM

## 2023-09-07 RX ORDER — HYDRALAZINE HYDROCHLORIDE 25 MG/1
25 TABLET, FILM COATED ORAL EVERY 8 HOURS PRN
Status: DISCONTINUED | OUTPATIENT
Start: 2023-09-07 | End: 2023-09-08 | Stop reason: HOSPADM

## 2023-09-07 RX ORDER — POLYETHYLENE GLYCOL 3350 17 G/17G
17 POWDER, FOR SOLUTION ORAL 2 TIMES DAILY PRN
Status: DISCONTINUED | OUTPATIENT
Start: 2023-09-07 | End: 2023-09-08 | Stop reason: HOSPADM

## 2023-09-07 RX ORDER — SODIUM CHLORIDE 0.9 % (FLUSH) 0.9 %
5 SYRINGE (ML) INJECTION
Status: DISCONTINUED | OUTPATIENT
Start: 2023-09-07 | End: 2023-09-08 | Stop reason: HOSPADM

## 2023-09-07 RX ORDER — ASPIRIN 325 MG
325 TABLET ORAL
Status: COMPLETED | OUTPATIENT
Start: 2023-09-07 | End: 2023-09-07

## 2023-09-07 RX ORDER — ERGOCALCIFEROL 1.25 MG/1
1 CAPSULE ORAL WEEKLY
COMMUNITY
Start: 2023-08-28

## 2023-09-07 RX ORDER — BISACODYL 10 MG
10 SUPPOSITORY, RECTAL RECTAL DAILY PRN
Status: DISCONTINUED | OUTPATIENT
Start: 2023-09-07 | End: 2023-09-08 | Stop reason: HOSPADM

## 2023-09-07 RX ORDER — FAMOTIDINE 20 MG/1
20 TABLET, FILM COATED ORAL DAILY PRN
Status: DISCONTINUED | OUTPATIENT
Start: 2023-09-07 | End: 2023-09-08 | Stop reason: HOSPADM

## 2023-09-07 RX ORDER — CARBAMAZEPINE 100 MG/1
300 CAPSULE, EXTENDED RELEASE ORAL DAILY
Status: DISCONTINUED | OUTPATIENT
Start: 2023-09-07 | End: 2023-09-07

## 2023-09-07 RX ORDER — SIMETHICONE 80 MG
1 TABLET,CHEWABLE ORAL 4 TIMES DAILY PRN
Status: DISCONTINUED | OUTPATIENT
Start: 2023-09-07 | End: 2023-09-08 | Stop reason: HOSPADM

## 2023-09-07 RX ORDER — PROCHLORPERAZINE EDISYLATE 5 MG/ML
5 INJECTION INTRAMUSCULAR; INTRAVENOUS EVERY 6 HOURS PRN
Status: DISCONTINUED | OUTPATIENT
Start: 2023-09-07 | End: 2023-09-08 | Stop reason: HOSPADM

## 2023-09-07 RX ORDER — MAG HYDROX/ALUMINUM HYD/SIMETH 200-200-20
30 SUSPENSION, ORAL (FINAL DOSE FORM) ORAL 4 TIMES DAILY PRN
Status: DISCONTINUED | OUTPATIENT
Start: 2023-09-07 | End: 2023-09-08 | Stop reason: HOSPADM

## 2023-09-07 RX ORDER — LORATADINE 10 MG/1
10 TABLET ORAL DAILY PRN
COMMUNITY
End: 2024-02-23

## 2023-09-07 RX ORDER — ONDANSETRON 8 MG/1
8 TABLET, ORALLY DISINTEGRATING ORAL EVERY 8 HOURS PRN
Status: DISCONTINUED | OUTPATIENT
Start: 2023-09-07 | End: 2023-09-08 | Stop reason: HOSPADM

## 2023-09-07 RX ORDER — ACETAMINOPHEN 325 MG/1
650 TABLET ORAL EVERY 4 HOURS PRN
Status: DISCONTINUED | OUTPATIENT
Start: 2023-09-07 | End: 2023-09-08 | Stop reason: HOSPADM

## 2023-09-07 RX ORDER — NALOXONE HCL 0.4 MG/ML
0.02 VIAL (ML) INJECTION
Status: DISCONTINUED | OUTPATIENT
Start: 2023-09-07 | End: 2023-09-08 | Stop reason: HOSPADM

## 2023-09-07 RX ORDER — ATORVASTATIN CALCIUM 20 MG/1
20 TABLET, FILM COATED ORAL DAILY
Status: DISCONTINUED | OUTPATIENT
Start: 2023-09-08 | End: 2023-09-08 | Stop reason: HOSPADM

## 2023-09-07 RX ORDER — ACETAMINOPHEN 500 MG
1000 TABLET ORAL EVERY 8 HOURS PRN
Status: DISCONTINUED | OUTPATIENT
Start: 2023-09-07 | End: 2023-09-08 | Stop reason: HOSPADM

## 2023-09-07 RX ORDER — CARBAMAZEPINE 100 MG/1
300 CAPSULE, EXTENDED RELEASE ORAL NIGHTLY
Status: DISCONTINUED | OUTPATIENT
Start: 2023-09-08 | End: 2023-09-08 | Stop reason: HOSPADM

## 2023-09-07 RX ORDER — TALC
6 POWDER (GRAM) TOPICAL NIGHTLY PRN
Status: DISCONTINUED | OUTPATIENT
Start: 2023-09-07 | End: 2023-09-08 | Stop reason: HOSPADM

## 2023-09-07 RX ADMIN — ASPIRIN 325 MG ORAL TABLET 325 MG: 325 PILL ORAL at 06:09

## 2023-09-07 NOTE — ED PROVIDER NOTES
Encounter Date: 9/7/2023       History     Chief Complaint   Patient presents with    Chest Pain     C/o chest pain x 5 days, reports some intermittent SOB.      This is a 68-year-old female who presents to the emergency department today as she has been feeling increasing fatigue over the past 10 days as well as intermittent alarms on her Apple watch that are telling her that her heart rate is low.  At times it gets into the 30s to 40s.  She also states that she has had some increased dyspnea with exertion and also some pleuritic chest wall pain on the left side of the chest.  In total these symptoms have been going on for approximately 10 days.  She had a Holter monitor placed that I can see in my review of the [facility] records but I could not see any results yet reported.  In regards to her cardiac history she has a history of hypercholesterolemia as well as a family history with her father with cardiac disease including AFib and congestive heart failure.  She was managed on metoprolol for quite some time.  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  She states that as she was discussing some of these recent symptoms that she was having over the last 10 days her physician asked her to discontinue the metoprolol.  She has been off of it now since Monday which was 4 days ago.  She has continued to have these symptoms.  She has had no syncope.  No near-syncope.  No abdominal pain.  No vomiting or diarrhea.  No bleeding diatheses.  She has no history of thyroid disease.  She came to the emergency department particularly this morning as she had gotten up to go work out a plan at fitness and discontinued the the workout due to some fatigue, this pleuritic chest pain, and once again these continued low heart rate alarms.      Review of patient's allergies indicates:  No Known Allergies  Past  Medical History:   Diagnosis Date    Premature atrial contractions     Seizure disorder      Past Surgical History:   Procedure Laterality Date     SECTION      ENDOMETRIAL ABLATION  2008    HERNIA REPAIR      Tonsillectomy       No family history on file.  Social History     Tobacco Use    Smoking status: Former    Smokeless tobacco: Never   Substance Use Topics    Alcohol use: Yes     Comment: Socially    Drug use: No     Review of Systems   Constitutional:  Positive for fatigue. Negative for chills and fever.   HENT:  Negative for sore throat.    Respiratory:  Positive for shortness of breath. Negative for cough.    Cardiovascular:  Positive for chest pain. Negative for leg swelling.   Gastrointestinal:  Negative for abdominal pain, diarrhea, nausea and vomiting.   Genitourinary:  Negative for dysuria.   Musculoskeletal:  Negative for back pain.   Skin:  Negative for rash.   Neurological:  Negative for syncope.   Hematological:  Does not bruise/bleed easily.   Psychiatric/Behavioral:  Negative for confusion.        Physical Exam     Initial Vitals [23 0610]   BP Pulse Resp Temp SpO2   136/78 93 18 97.8 °F (36.6 °C) 98 %      MAP       --         Physical Exam  VS upon arrival: as above.   Consititutional: Pt is awake, alert, and oriented x 4. Does not appear to be in any rm distress.  HEENT: PERRL; EOMI; nares patent; op clear; mmm without lesions.  Neck: Supple with good ROM.  CV: Normal rate; regular rhythm; no mrg. Heart sounds normal. No peripheral edema. No homans or cords. 2+ radials bilateral and symmetric.  Respiratory: CTA bilaterally with no focal rales, ronchi, or wheezes.  GI: Abdomen soft, NTND. No rebound. No guarding. BS normal.  MSK: No long bone deformities; no focal joint swellings.  Neuro: MAEW.   Skin: No skin lesions or rash.    ED Course   Procedures  Labs Reviewed   CBC W/ AUTO DIFFERENTIAL - Abnormal; Notable for the following components:       Result Value    RBC 3.79  (*)     MCV 99 (*)     MCH 33.0 (*)     All other components within normal limits   COMPREHENSIVE METABOLIC PANEL - Abnormal; Notable for the following components:    CO2 22 (*)     All other components within normal limits   B-TYPE NATRIURETIC PEPTIDE - Abnormal; Notable for the following components:     (*)     All other components within normal limits   HIV 1 / 2 ANTIBODY    Narrative:     Release to patient->Immediate   HEPATITIS C ANTIBODY    Narrative:     Release to patient->Immediate   TROPONIN I   D DIMER, QUANTITATIVE   TSH   URINALYSIS, REFLEX TO URINE CULTURE    Narrative:     Specimen Source->Urine   TROPONIN ISTAT   TROPONIN I   TROPONIN ISTAT   TROPONIN I   POCT TROPONIN   POCT TROPONIN        ECG Results              EKG 12-lead (Final result)  Result time 09/07/23 12:18:59      Final result by Interface, Lab In [facility] (09/07/23 12:18:59)                   Narrative:    Test Reason : R07.9,    Vent. Rate : 084 BPM     Atrial Rate : 084 BPM     P-R Int : 130 ms          QRS Dur : 090 ms      QT Int : 338 ms       P-R-T Axes : 053 020 041 degrees     QTc Int : 399 ms    Sinus rhythm with Premature atrial complexes in a pattern of bigeminy  Nonspecific ST and T wave abnormality  Abnormal ECG  When compared with ECG of 01-NOV-2022 15:24,  Premature atrial complexes are now Present  Confirmed by Everardo Moss MD ([id number]) on 9/7/2023 12:18:53 PM    Referred By: [id number]   SELF           Confirmed By:Everardo Moss MD                                  Imaging Results              X-Ray Chest PA And Lateral (Final result)  Result time 09/07/23 08:55:44      Final result by Jaiden Mendez MD (09/07/23 08:55:44)                   Impression:      See above      Electronically signed by: Jaiden Mendez MD  Date:    09/07/2023  Time:    08:55               Narrative:    EXAMINATION:  XR CHEST PA AND LATERAL    CLINICAL HISTORY:  Chest Pain;    TECHNIQUE:  PA and lateral views of the chest were  performed.    COMPARISON:  None    FINDINGS:  Heart size normal.  The lungs are clear.  No pleural effusion                        Final result by Jaiden Mendez MD (09/07/23 08:55:44)                   Impression:      See above      Electronically signed by: Jaiden Mendez MD  Date:    09/07/2023  Time:    08:55               Narrative:    EXAMINATION:  XR CHEST PA AND LATERAL    CLINICAL HISTORY:  Chest Pain;    TECHNIQUE:  PA and lateral views of the chest were performed.    COMPARISON:  None    FINDINGS:  Heart size normal.  The lungs are clear.  No pleural effusion                        Final result by Jaiden Mendez MD (09/07/23 08:55:44)                   Impression:      See above      Electronically signed by: Jaiden Mendez MD  Date:    09/07/2023  Time:    08:55               Narrative:    EXAMINATION:  XR CHEST PA AND LATERAL    CLINICAL HISTORY:  Chest Pain;    TECHNIQUE:  PA and lateral views of the chest were performed.    COMPARISON:  None    FINDINGS:  Heart size normal.  The lungs are clear.  No pleural effusion                                       Medications   sodium chloride 0.9% flush 10 mL (has no administration in time range)   melatonin tablet 6 mg (has no administration in time range)   sodium chloride 0.9% flush 5 mL (has no administration in time range)   ondansetron disintegrating tablet 8 mg (has no administration in time range)   prochlorperazine injection Soln 5 mg (has no administration in time range)   polyethylene glycol packet 17 g (has no administration in time range)   bisacodyL suppository 10 mg (has no administration in time range)   simethicone chewable tablet 80 mg (has no administration in time range)   aluminum-magnesium hydroxide-simethicone 200-200-20 mg/5 mL suspension 30 mL (has no administration in time range)   acetaminophen tablet 650 mg (has no administration in time range)   acetaminophen tablet 1,000 mg (has no administration in time range)   naloxone 0.4 mg/mL  injection 0.02 mg (has no administration in time range)   aspirin tablet 325 mg (325 mg Oral Given 9/7/23 6953)     Medical Decision Making  This is a 68-year-old female who presents with fatigue, low heart rate alarms on her Apple watch, and pleuritic chest pain. She arrives in bigeminy.   My primary concern is that patient's HR is becoming quite bradycardic. Seems to be bigeminy throughout that time. It is producing symptoms. This will need to be addressed.   As far as causes of this bradycardia are concerned, thus far I am unsure regarding the cause. We considered electrolyte abnormalities, thyroid disease, and cardiac ischemia primarily. However, electrolytes are reassuring, thyroids are normal, and first troponin is reassuring as well as no concerning st/t wave changes on ECG.   Patient also has had some SOB with this. D Dimer was normal which I feel can help us rule out PE.   At this point, I felt the patient required admission to , thus I have done so. I also formally consulted cardiology down here in the ED (given symptomatic bradycardia at times < 45) for guidance on next steps. I have spoken with the cardiology fellow about plan which will be to obs the patient on telemetry. Depending on telemetry findings, patient may be a potential candidate for outpatient EP but this will need to be determined by telemetry findings here in the hospital.   Patient has very transient dips into the 30s-40s. Has not required any chronotropic support either chemically or via pacing.  ED Diagnosis:  1. Acute symptomatic bradycardia.     Problems Addressed:  Chest pain: acute illness or injury  Palpitations: acute illness or injury  Symptomatic bradycardia: acute illness or injury    Amount and/or Complexity of Data Reviewed  Independent Historian: spouse and friend     Details: Patient's friend and spouse both present at bedside.   External Data Reviewed: notes.     Details: Recent holter placed. Formal results have not yet  returned.  Labs: ordered. Decision-making details documented in ED Course.  Radiology: ordered and independent interpretation performed.     Details: Chest x-ray, independently reviewed and interpreted by me and interpreted by Radiology, reveals no acute infiltrate or effusion.  ECG/medicine tests: ordered and independent interpretation performed. Decision-making details documented in ED Course.  Discussion of management or test interpretation with external provider(s): I have discussed the case with Cardiology informal consultation as well as with Hospital Medicine upon admission.    Risk  OTC drugs.  Prescription drug management.  Decision regarding hospitalization.               ED Course as of 09/07/23 1511   Thu Sep 07, 2023   0644 ECG, independently reviewed and interpreted by me, reveals bigeminy pattern with a reported rate of 84.  There are nonspecific ST and T-wave changes that are not concerning for acute ischemia or infarction. [NM]   0811 Troponin ISTAT  POCT normal.   [NM]   0839 D dimer, quantitative  D dimer normal. [NM]   0839 BNP(!): 202 [NM]   0840 Troponin I: <0.006 [NM]   0902 Working on admission. Has had some brief / transient dips of HR into the 40s. No hypotension with this. No AMS.  [NM]   0903 Will assure patient is on zoll. [NM]      ED Course User Index  [NM] Sara Olsen MD                      Clinical Impression:   Final diagnoses:  [R07.9] Chest pain  [R00.2] Palpitations  [R00.1] Symptomatic bradycardia (Primary)        ED Disposition Condition    Observation Stable                Sara Olsen MD  09/07/23 8924

## 2023-09-07 NOTE — H&P
"[facility] [address] [place] [address] Medicine  History & Physical    Patient Name: Teresa Dennis  MRN: [id number]  Patient Class: OP- Observation  Admission Date: 9/7/2023  Attending Physician: Micheal Cochran MD   Primary Care Provider: Zeinab Primary Doctor         Patient information was obtained from patient, spouse/SO and ER records.     Subjective:     Principal Problem:Symptomatic bradycardia    Chief Complaint:   Chief Complaint   Patient presents with    Chest Pain     C/o chest pain x 5 days, reports some intermittent SOB.         HPI: Teresa Dennis is a 67 yo F with PMHx of HTN, HLD, PACs, and seizure disorder who presented to the ED for intermittent L sided chest pain. Patient states the her chest pain began about 5 days ago, but worsened today while she was exercising. Pain is nonradiating and worse with movement. Patient states that "it feels like a pulled muscle."  She states that about two weeks ago while at work her Apple watch was informing her that her HR was dropping below <40bpm for minutes at a time. She informed her cardiologist about watch alerts and was placed on 48hr Holter monitor, which showed frequent PACs/PVCs. She was on metoprolol 50 mg daily, which was stopped on Tuesday for her bradycardia. The only symptom she reports with these episodes is fatigue when at rest. Denies fever, chills, syncope, lightheadedness, SOB, cough, abdominal pain, n/v/d, dysuria, and LE swelling.     In ED: Afebrile. HR 30-50s. BP elevated. EKG showed NSR with bigeminy PACs @83bmp. CXR showed normal heart size, lungs are clear, no pleural effusion.  - no previous baseline. Trop <0.006 --> 0.013. D-dimer 0.25. No leukocytosis. Electrolyte levels within normal limits. Cardiology consulted. Given aspirin 325mg po x1 in ED. Placed in observation for further evaluation.      Past Medical History:   Diagnosis Date    Premature atrial contractions     Seizure disorder        Past Surgical History: "   Procedure Laterality Date     SECTION      ENDOMETRIAL ABLATION      HERNIA REPAIR      Tonsillectomy         Review of patient's allergies indicates:  No Known Allergies    No current facility-administered medications on file prior to encounter.     Current Outpatient Medications on File Prior to Encounter   Medication Sig    atorvastatin (LIPITOR) 20 MG tablet TAKE 1 TABLET BY MOUTH EVERY DAY    carBAMazepine (CARBATROL) 300 MG CM12 TAKE 1 CAPSULE BY MOUTH TWICE A DAY    metoprolol succinate (TOPROL-XL) 50 MG 24 hr tablet TAKE 1 TABLET BY MOUTH EVERY DAY    omeprazole (PRILOSEC OTC) 20 MG tablet Take 20 mg by mouth once daily.     Family History    None       Tobacco Use    Smoking status: Former    Smokeless tobacco: Never   Substance and Sexual Activity    Alcohol use: Yes     Comment: Socially    Drug use: No    Sexual activity: Yes     Partners: Male     Comment:      Review of Systems   Constitutional:  Positive for fatigue. Negative for activity change, chills and fever.   HENT:  Negative for trouble swallowing.    Eyes:  Negative for photophobia and visual disturbance.   Respiratory:  Negative for cough, chest tightness and shortness of breath.    Cardiovascular:  Positive for chest pain. Negative for palpitations and leg swelling.   Gastrointestinal:  Negative for abdominal pain, constipation, diarrhea, nausea and vomiting.   Genitourinary:  Negative for dysuria, frequency and hematuria.   Musculoskeletal:  Negative for back pain, gait problem and neck pain.   Skin:  Negative for rash and wound.   Neurological:  Negative for dizziness, syncope, speech difficulty and light-headedness.   Psychiatric/Behavioral:  Negative for agitation and confusion. The patient is not nervous/anxious.      Objective:     Vital Signs (Most Recent):  Temp: 97.6 °F (36.4 °C) (23 1245)  Pulse: (!) 44 (23 124)  Resp: 16 (23 124)  BP: 124/61 (23 124)  SpO2: 97 %  (09/07/23 1245) Vital Signs (24h Range):  Temp:  [97.6 °F (36.4 °C)-97.8 °F (36.6 °C)] 97.6 °F (36.4 °C)  Pulse:  [39-93] 44  Resp:  [15-19] 16  SpO2:  [96 %-100 %] 97 %  BP: (124-168)/(60-78) 124/61     Weight: 69.4 kg (153 lb)  Body mass index is 28.91 kg/m².     Physical Exam  Vitals and nursing note reviewed.   Constitutional:       General: She is not in acute distress.     Appearance: She is well-developed.   HENT:      Head: Normocephalic and atraumatic.      Mouth/Throat:      Pharynx: No oropharyngeal exudate.   Eyes:      Conjunctiva/sclera: Conjunctivae normal.      Pupils: Pupils are equal, round, and reactive to light.   Cardiovascular:      Rate and Rhythm: Regular rhythm. Bradycardia present.      Heart sounds: Normal heart sounds.   Pulmonary:      Effort: Pulmonary effort is normal. No respiratory distress.      Breath sounds: Normal breath sounds. No wheezing.   Abdominal:      General: Bowel sounds are normal. There is no distension.      Palpations: Abdomen is soft.      Tenderness: There is no abdominal tenderness.   Musculoskeletal:         General: No tenderness. Normal range of motion.      Cervical back: Normal range of motion and neck supple.   Lymphadenopathy:      Cervical: No cervical adenopathy.   Skin:     General: Skin is warm and dry.      Capillary Refill: Capillary refill takes less than 2 seconds.      Findings: No rash.   Neurological:      Mental Status: She is alert and oriented to person, place, and time.      Cranial Nerves: No cranial nerve deficit.      Sensory: No sensory deficit.      Coordination: Coordination normal.   Psychiatric:         Behavior: Behavior normal.         Thought Content: Thought content normal.         Judgment: Judgment normal.              CRANIAL NERVES     CN III, IV, VI   Pupils are equal, round, and reactive to light.       Significant Labs: All pertinent labs within the past 24 hours have been reviewed.  CBC:   Recent Labs   Lab 09/07/23  0701    WBC 4.24   HGB 12.5   HCT 37.4        CMP:   Recent Labs   Lab 09/07/23  0701      K 4.4      CO2 22*   GLU 91   BUN 11   CREATININE 0.7   CALCIUM 9.4   PROT 6.8   ALBUMIN 3.8   BILITOT 0.4   ALKPHOS 84   AST 17   ALT 16   ANIONGAP 11     Cardiac Markers:   Recent Labs   Lab 09/07/23  0701   *     Troponin:   Recent Labs   Lab 09/07/23  0701 09/07/23  0917   TROPONINI <0.006 0.013     TSH:   Recent Labs   Lab 09/07/23  0701   TSH 0.658     Urine Studies:   Recent Labs   Lab 09/07/23  0751   COLORU Yellow   APPEARANCEUA Clear   PHUR 7.0   SPECGRAV 1.010   PROTEINUA Negative   GLUCUA Negative   KETONESU Negative   BILIRUBINUA Negative   OCCULTUA Negative   NITRITE Negative   LEUKOCYTESUR Negative       Significant Imaging: I have reviewed all pertinent imaging results/findings within the past 24 hours.    Assessment/Plan:     * Symptomatic bradycardia  Premature atrial contractions     - Seen by outpatient cards for PACs and recent 48hr Holter monitor  - Troponin <0.006 --> 0.013  -    - CXR without acute process  - TSH and electrolytes wnl  - Echo below  - Monitor on telemetry  - cardiac diet   - Cardiology consulted:    - recommend watching her overnight and follow up with EP outpatient      Results for orders placed during the hospital encounter of 09/07/23    Echo    Interpretation Summary    Left Ventricle: The left ventricle is normal in size. Normal wall thickness. There is concentric remodeling. Normal wall motion. There is normal systolic function with a visually estimated ejection fraction of 55 - 60%. There is normal diastolic function.    Right Ventricle: Normal right ventricular cavity size. Wall thickness is normal. Right ventricle wall motion  is normal. Systolic function is normal.    Left Atrium: Left atrium is moderately dilated.    Pulmonary Artery: The estimated pulmonary artery systolic pressure is 32 mmHg.    IVC/SVC: Normal venous pressure at 3  mmHg.    Pure hypercholesterolemia  - continue pt's home atorvastatin 20mg po daily     Seizure disorder  - continue pt's home carbamazepine 300mg po daily       VTE Risk Mitigation (From admission, onward)         Ordered     IP VTE LOW RISK PATIENT  Once         09/07/23 1145     Place OMAR hose  Until discontinued         09/07/23 1145     IP VTE LOW RISK PATIENT  Once         09/07/23 1145     Place sequential compression device  Until discontinued         09/07/23 1145                     On 09/07/2023, patient should be placed in hospital observation services under my care in collaboration with Dr. Micheal Cochran.      Judi Allen PA-C  Department of Hospital Medicine  [facility]

## 2023-09-07 NOTE — HPI
68-year-old female with past medical history of hypertension, hyperlipidemia, history of PACs and PVCs presents to the emergency room because of fatigue.  Patient was noticing low heart rates on her Apple watch and message cardiologist for which she got Holter monitor that showed heart rate ranging from 30s to 100s and also has significant PVCs and PACs.  She is on metoprolol 50 mg which was dropped to 25 mg daily and she stopped completely taking it from Wednesday this week.  Patient denies having any history of presyncopal or syncopal events.  Her main complaint is tired and fatigued.  Patient denies having any chest pain shortness of breath on exertion or at rest.  On making her exercise in the emergency room her heart rate went up to 90s and she did not have any AV blocks.  Her TSH is 0.6 which was checked recently and does not have any electrolyte abnormalities on her basic metabolic panel

## 2023-09-07 NOTE — ED NOTES
Teresa Dennis, a 68 y.o. female presents to the ED w/ complaint of  chest pain that has been going on for the last 5 days. Lab workup started, connected to cardiac monitoring, blood pressure cuff and pulse ox. Alert x 4.     Triage note:  Chief Complaint   Patient presents with    Chest Pain     C/o chest pain x 5 days, reports some intermittent SOB.      Review of patient's allergies indicates:  No Known Allergies  Past Medical History:   Diagnosis Date    Premature atrial contractions     Seizure disorder

## 2023-09-07 NOTE — ASSESSMENT & PLAN NOTE
Patient presents to the emergency room because her Apple watch is alerting  for bradycardia.  Her last dose of metoprolol was on Tuesday.  Whenever I made her to do exercise in the emergency room her heart rate went up to 90, and no AV blocks or seen.    TSH and electrolytes WNL.    Plan   Recommend to watch her overnight and patient can follow up with EP as outpatient for PAC.

## 2023-09-07 NOTE — SUBJECTIVE & OBJECTIVE
Past Medical History:   Diagnosis Date    Premature atrial contractions     Seizure disorder        Past Surgical History:   Procedure Laterality Date     SECTION      ENDOMETRIAL ABLATION      HERNIA REPAIR      Tonsillectomy         Review of patient's allergies indicates:  No Known Allergies    No current facility-administered medications on file prior to encounter.     Current Outpatient Medications on File Prior to Encounter   Medication Sig    atorvastatin (LIPITOR) 20 MG tablet TAKE 1 TABLET BY MOUTH EVERY DAY    carBAMazepine (CARBATROL) 300 MG CM12 TAKE 1 CAPSULE BY MOUTH TWICE A DAY    metoprolol succinate (TOPROL-XL) 50 MG 24 hr tablet TAKE 1 TABLET BY MOUTH EVERY DAY    omeprazole (PRILOSEC OTC) 20 MG tablet Take 20 mg by mouth once daily.     Family History    None       Tobacco Use    Smoking status: Former    Smokeless tobacco: Never   Substance and Sexual Activity    Alcohol use: Yes     Comment: Socially    Drug use: No    Sexual activity: Yes     Partners: Male     Comment:      Review of Systems   Constitutional: Positive for malaise/fatigue.   HENT: Negative.     Eyes: Negative.    Cardiovascular: Negative.    Respiratory: Negative.     Endocrine: Negative.    Skin: Negative.    Musculoskeletal: Negative.    Gastrointestinal: Negative.    Neurological: Negative.      Objective:     Vital Signs (Most Recent):  Temp: 97.6 °F (36.4 °C) (23 1245)  Pulse: (!) 44 (23 1245)  Resp: 16 (23 1245)  BP: 124/61 (23 1245)  SpO2: 97 % (23 1245) Vital Signs (24h Range):  Temp:  [97.6 °F (36.4 °C)-97.8 °F (36.6 °C)] 97.6 °F (36.4 °C)  Pulse:  [39-93] 44  Resp:  [15-19] 16  SpO2:  [96 %-100 %] 97 %  BP: (124-168)/(60-78) 124/61     Weight: 69.4 kg (153 lb)  Body mass index is 28.91 kg/m².    SpO2: 97 %       No intake or output data in the 24 hours ending 23 1314    Lines/Drains/Airways       Peripheral Intravenous Line  Duration                   Peripheral IV - Single Lumen 09/07/23 0701 20 G Anterior;Proximal;Right Forearm <1 day                     Physical Exam  Constitutional:       Appearance: Normal appearance.   HENT:      Head: Normocephalic and atraumatic.      Mouth/Throat:      Mouth: Mucous membranes are moist.   Cardiovascular:      Rate and Rhythm: Regular rhythm. Bradycardia present.      Pulses: Normal pulses.      Heart sounds: Normal heart sounds.   Pulmonary:      Effort: Pulmonary effort is normal.      Breath sounds: Normal breath sounds.   Abdominal:      General: Abdomen is flat. Bowel sounds are normal.      Palpations: Abdomen is soft.   Musculoskeletal:         General: Normal range of motion.      Cervical back: Normal range of motion.   Skin:     General: Skin is warm.      Capillary Refill: Capillary refill takes less than 2 seconds.   Neurological:      General: No focal deficit present.      Mental Status: She is alert and oriented to person, place, and time.          Significant Labs: All pertinent lab results from the last 24 hours have been reviewed.    Significant Imaging: EKG: sinus  rhythm with PAC

## 2023-09-07 NOTE — PHARMACY MED REC
"  Admission Medication History     The home medication history was taken by Iman Moser.    You may go to "Admission" then "Reconcile Home Medications" tabs to review and/or act upon these items.     The home medication list has been updated by the Pharmacy department.   Please read ALL comments highlighted in yellow.   Please address this information as you see fit.    Feel free to contact us if you have any questions or require assistance.      The medications listed below were removed from the home medication list. Please reorder if appropriate:  Patient reports no longer taking the following medication(s):  OMEPRAZOLE (PRILOSEC OTC)    Medications listed below were obtained from: Patient/family  Current Outpatient Medications on File Prior to Encounter   Medication Sig    atorvastatin (LIPITOR) 20 MG tablet   TAKE 1 TABLET BY MOUTH EVERY DAY    carBAMazepine (CARBATROL) 300 MG CM12   Take 300 mg by mouth once daily.      ergocalciferol (ERGOCALCIFEROL) 50,000 unit Cap   Take 1 capsule by mouth once a week.    famotidine (PEPCID AC ORAL) Take 1 tablet by mouth daily as needed (Heartburn).      loratadine (CLARITIN) 10 mg tablet   Take 10 mg by mouth daily as needed for Allergies.    metoprolol succinate (TOPROL-XL) 50 MG 24 hr tablet   TAKE 1 TABLET BY MOUTH EVERY DAY (Patient not taking: Reported on 9/7/2023 due to low heart rate).     Potential issues to be addressed PRIOR TO DISCHARGE  Patient reported not taking the following medications: (TOPROL-XL). These medications remain on the home medication list. Please address accordingly.             Iman Moser  EXT [id number]                .          "

## 2023-09-07 NOTE — CONSULTS
[facility]  Cardiology  Consult Note    Patient Name: Teresa Dennis  MRN: [id number]  Admission Date: 2023  Hospital Length of Stay: 0 days  Code Status: Full Code   Attending Provider: Micheal Cochran MD   Consulting Provider: Des Segundo MD  Primary Care Physician: Zeinab, Primary Doctor  Principal Problem:Symptomatic bradycardia    Patient information was obtained from patient and ER records.     Inpatient consult to Cardiology  Consult performed by: Des Segundo MD  Consult ordered by: Sara Olsen MD        Subjective:     Chief Complaint:  Bradycardia      HPI:   68-year-old female with past medical history of hypertension, hyperlipidemia, history of PACs and PVCs presents to the emergency room because of fatigue.  Patient was noticing low heart rates on her Apple watch and message cardiologist for which she got Holter monitor that showed heart rate ranging from 30s to 100s and also has significant PVCs and PACs.  She is on metoprolol 50 mg which was dropped to 25 mg daily and she stopped completely taking it from Wednesday this week.  Patient denies having any history of presyncopal or syncopal events.  Her main complaint is tired and fatigued.  Patient denies having any chest pain shortness of breath on exertion or at rest.  On making her exercise in the emergency room her heart rate went up to 90s and she did not have any AV blocks.  Her TSH is 0.6 which was checked recently and does not have any electrolyte abnormalities on her basic metabolic panel      Past Medical History:   Diagnosis Date    Premature atrial contractions     Seizure disorder        Past Surgical History:   Procedure Laterality Date     SECTION      ENDOMETRIAL ABLATION      HERNIA REPAIR      Tonsillectomy         Review of patient's allergies indicates:  No Known Allergies    No current facility-administered medications on file prior to encounter.     Current Outpatient Medications on  File Prior to Encounter   Medication Sig    atorvastatin (LIPITOR) 20 MG tablet TAKE 1 TABLET BY MOUTH EVERY DAY    carBAMazepine (CARBATROL) 300 MG CM12 TAKE 1 CAPSULE BY MOUTH TWICE A DAY    metoprolol succinate (TOPROL-XL) 50 MG 24 hr tablet TAKE 1 TABLET BY MOUTH EVERY DAY    omeprazole (PRILOSEC OTC) 20 MG tablet Take 20 mg by mouth once daily.     Family History    None       Tobacco Use    Smoking status: Former    Smokeless tobacco: Never   Substance and Sexual Activity    Alcohol use: Yes     Comment: Socially    Drug use: No    Sexual activity: Yes     Partners: Male     Comment:      Review of Systems   Constitutional: Positive for malaise/fatigue.   HENT: Negative.     Eyes: Negative.    Cardiovascular: Negative.    Respiratory: Negative.     Endocrine: Negative.    Skin: Negative.    Musculoskeletal: Negative.    Gastrointestinal: Negative.    Neurological: Negative.      Objective:     Vital Signs (Most Recent):  Temp: 97.6 °F (36.4 °C) (09/07/23 1245)  Pulse: (!) 44 (09/07/23 1245)  Resp: 16 (09/07/23 1245)  BP: 124/61 (09/07/23 1245)  SpO2: 97 % (09/07/23 1245) Vital Signs (24h Range):  Temp:  [97.6 °F (36.4 °C)-97.8 °F (36.6 °C)] 97.6 °F (36.4 °C)  Pulse:  [39-93] 44  Resp:  [15-19] 16  SpO2:  [96 %-100 %] 97 %  BP: (124-168)/(60-78) 124/61     Weight: 69.4 kg (153 lb)  Body mass index is 28.91 kg/m².    SpO2: 97 %       No intake or output data in the 24 hours ending 09/07/23 1314    Lines/Drains/Airways       Peripheral Intravenous Line  Duration                  Peripheral IV - Single Lumen 09/07/23 0701 20 G Anterior;Proximal;Right Forearm <1 day                     Physical Exam  Constitutional:       Appearance: Normal appearance.   HENT:      Head: Normocephalic and atraumatic.      Mouth/Throat:      Mouth: Mucous membranes are moist.   Cardiovascular:      Rate and Rhythm: Regular rhythm. Bradycardia present.      Pulses: Normal pulses.      Heart sounds: Normal heart sounds.    Pulmonary:      Effort: Pulmonary effort is normal.      Breath sounds: Normal breath sounds.   Abdominal:      General: Abdomen is flat. Bowel sounds are normal.      Palpations: Abdomen is soft.   Musculoskeletal:         General: Normal range of motion.      Cervical back: Normal range of motion.   Skin:     General: Skin is warm.      Capillary Refill: Capillary refill takes less than 2 seconds.   Neurological:      General: No focal deficit present.      Mental Status: She is alert and oriented to person, place, and time.          Significant Labs: All pertinent lab results from the last 24 hours have been reviewed.    Significant Imaging: EKG: sinus  rhythm with PAC     Assessment and Plan:     * Symptomatic bradycardia  Patient presents to the emergency room because her Apple watch is alerting  for bradycardia.  Her last dose of metoprolol was on Tuesday.  Whenever I made her to do exercise in the emergency room her heart rate went up to 90, and no AV blocks or seen.    TSH and electrolytes WNL.    Plan   Recommend to watch her overnight and patient can follow up with EP as outpatient for PAC.           VTE Risk Mitigation (From admission, onward)           Ordered     IP VTE LOW RISK PATIENT  Once         09/07/23 1145     Place OMAR hose  Until discontinued         09/07/23 1145     IP VTE LOW RISK PATIENT  Once         09/07/23 1145     Place sequential compression device  Until discontinued         09/07/23 1145                    Thank you for your consult. I will sign off. Please contact us if you have any additional questions.    Des Segundo MD  Cardiology   [facility] - Emergency Dept

## 2023-09-07 NOTE — ASSESSMENT & PLAN NOTE
Premature atrial contractions     - Seen by outpatient cards for PACs and recent 48hr Holter monitor  - Troponin <0.006 --> 0.013  -    - CXR without acute process  - TSH and electrolytes wnl  - Echo below  - Monitor on telemetry  - cardiac diet   - Cardiology consulted:    - recommend watching her overnight and follow up with EP outpatient      Results for orders placed during the hospital encounter of 09/07/23    Echo    Interpretation Summary    Left Ventricle: The left ventricle is normal in size. Normal wall thickness. There is concentric remodeling. Normal wall motion. There is normal systolic function with a visually estimated ejection fraction of 55 - 60%. There is normal diastolic function.    Right Ventricle: Normal right ventricular cavity size. Wall thickness is normal. Right ventricle wall motion  is normal. Systolic function is normal.    Left Atrium: Left atrium is moderately dilated.    Pulmonary Artery: The estimated pulmonary artery systolic pressure is 32 mmHg.    IVC/SVC: Normal venous pressure at 3 mmHg.

## 2023-09-07 NOTE — ED NOTES
Teresa Dennis, a 68 y.o. female presents to the ED w/ complaint of chest pain, denies SOB at the moment. Pt was on a 48 hour heart monitor last week.     Triage note:  Chief Complaint   Patient presents with    Chest Pain     C/o chest pain x 5 days, reports some intermittent SOB.      Review of patient's allergies indicates:  No Known Allergies  Past Medical History:   Diagnosis Date    Premature atrial contractions     Seizure disorder

## 2023-09-07 NOTE — ED NOTES
Assumed care of this pt at this time.   Patient identifiers verified and correct for Viet Dennis   LOC: The patient is awake, alert and aware of environment with an appropriate affect, the patient is oriented x 3 and speaking appropriately.   APPEARANCE: Patient appears comfortable and in no acute distress, patient is clean and well groomed.  SKIN: The skin is warm and dry, color consistent with ethnicity, patient has normal skin turgor and moist mucus membranes, skin intact, no breakdown or bruising noted.   MUSCULOSKELETAL: Patient moving all extremities spontaneously, no swelling noted.  RESPIRATORY: Airway is open and patent, respirations are spontaneous, patient has a normal effort and rate, no accessory muscle use noted, O2 sats noted at 96% on room air.  CARDIAC: Pt placed on cardiac monitor. Patient has a geovanny rate and regular rhythm, no edema noted, capillary refill < 3 seconds.   GASTRO: Soft and non tender to palpation, no distention noted, normoactive bowel sounds present in all four quadrants. Pt states bowel movements have been regular.  : Pt denies any pain or frequency with urination.  NEURO: Pt opens eyes spontaneously, behavior appropriate to situation, follows commands, facial expression symmetrical, bilateral hand grasp equal and even, purposeful motor response noted, normal sensation in all extremities when touched with a finger.

## 2023-09-07 NOTE — Clinical Note
Diagnosis: Symptomatic bradycardia [249395]   Future Attending Provider: WINIFRED COATS [[id number]]   Admitting Provider:: RADHA ORNELAS [[id number]]

## 2023-09-07 NOTE — HPI
"Teresa Dennis is a 69 yo F with PMHx of HTN, HLD, PACs, and seizure disorder who presented to the ED for intermittent L sided chest pain. Patient states the her chest pain began about 5 days ago, but worsened today while she was exercising. Pain is nonradiating and worse with movement. Patient states that "it feels like a pulled muscle."  She states that about two weeks ago while at work her Apple watch was informing her that her HR was dropping below <40bpm for minutes at a time. She informed her cardiologist about watch alerts and was placed on 48hr Holter monitor, which showed frequent PACs/PVCs. She was on metoprolol 50 mg daily, which was stopped on Tuesday for her bradycardia. The only symptom she reports with these episodes is fatigue when at rest. Denies fever, chills, syncope, lightheadedness, SOB, cough, abdominal pain, n/v/d, dysuria, and LE swelling.     In ED: Afebrile. HR 30-50s. BP elevated. EKG showed NSR with bigeminy PACs @83bmp. CXR showed normal heart size, lungs are clear, no pleural effusion.  - no previous baseline. Trop <0.006 --> 0.013. D-dimer 0.25. No leukocytosis. Electrolyte levels within normal limits. Cardiology consulted. Given aspirin 325mg po x1 in ED. Placed in observation for further evaluation.  "

## 2023-09-07 NOTE — PLAN OF CARE
Problem: Adult Inpatient Plan of Care  Goal: Plan of Care Review  9/7/2023 1738 by Hilton Schofield RN  Outcome: Ongoing, Progressing  9/7/2023 1737 by Hilton Schofield RN  Outcome: Ongoing, Progressing  Goal: Patient-Specific Goal (Individualized)  9/7/2023 1738 by Hilton Schofield RN  Outcome: Ongoing, Progressing  9/7/2023 1737 by Hilton Schofield RN  Outcome: Ongoing, Progressing  Goal: Absence of Hospital-Acquired Illness or Injury  9/7/2023 1738 by Hilton Schofield RN  Outcome: Ongoing, Progressing  9/7/2023 1737 by Hilton Schofield RN  Outcome: Ongoing, Progressing  Goal: Optimal Comfort and Wellbeing  9/7/2023 1738 by Hilton Schofield RN  Outcome: Ongoing, Progressing  9/7/2023 1737 by Hilton Schofield RN  Outcome: Ongoing, Progressing  Goal: Readiness for Transition of Care  9/7/2023 1738 by Hilton Schofield RN  Outcome: Ongoing, Progressing  9/7/2023 1737 by Hilton Schofield RN  Outcome: Ongoing, Progressing     Problem: Hypertension Comorbidity  Goal: Blood Pressure in Desired Range  9/7/2023 1738 by Hilton Schofield RN  Outcome: Ongoing, Progressing  9/7/2023 1737 by Hilton Schofield RN  Outcome: Ongoing, Progressing     Problem: Infection  Goal: Absence of Infection Signs and Symptoms  9/7/2023 1738 by Hilton Schofield RN  Outcome: Ongoing, Progressing  9/7/2023 1737 by Hilton Schofield RN  Outcome: Ongoing, Progressing     Problem: Seizure Disorder Comorbidity  Goal: Maintenance of Seizure Control  9/7/2023 1738 by Hilton Schofield RN  Outcome: Ongoing, Progressing  9/7/2023 1737 by Hilton Schofield RN  Outcome: Ongoing, Progressing     Problem: Fall Injury Risk  Goal: Absence of Fall and Fall-Related Injury  9/7/2023 1738 by Hilton Schofield RN  Outcome: Ongoing, Progressing  9/7/2023 1737 by Hilton Schofield RN  Outcome: Ongoing, Progressing

## 2023-09-08 VITALS
OXYGEN SATURATION: 93 % | HEIGHT: 61 IN | BODY MASS INDEX: 28.89 KG/M2 | HEART RATE: 40 BPM | SYSTOLIC BLOOD PRESSURE: 125 MMHG | DIASTOLIC BLOOD PRESSURE: 60 MMHG | RESPIRATION RATE: 16 BRPM | TEMPERATURE: 98 F | WEIGHT: 153 LBS

## 2023-09-08 DIAGNOSIS — I49.8 OTHER SPECIFIED CARDIAC ARRHYTHMIAS: Primary | ICD-10-CM

## 2023-09-08 LAB
ANION GAP SERPL CALC-SCNC: 10 MMOL/L (ref 8–16)
BUN SERPL-MCNC: 10 MG/DL (ref 8–23)
CALCIUM SERPL-MCNC: 9.2 MG/DL (ref 8.7–10.5)
CHLORIDE SERPL-SCNC: 108 MMOL/L (ref 95–110)
CHOLEST SERPL-MCNC: 188 MG/DL (ref 120–199)
CHOLEST/HDLC SERPL: 3.3 {RATIO} (ref 2–5)
CO2 SERPL-SCNC: 22 MMOL/L (ref 23–29)
CREAT SERPL-MCNC: 0.7 MG/DL (ref 0.5–1.4)
ERYTHROCYTE [DISTWIDTH] IN BLOOD BY AUTOMATED COUNT: 12.8 % (ref 11.5–14.5)
EST. GFR  (NO RACE VARIABLE): >60 ML/MIN/1.73 M^2
ESTIMATED AVG GLUCOSE: 108 MG/DL (ref 68–131)
GLUCOSE SERPL-MCNC: 100 MG/DL (ref 70–110)
HBA1C MFR BLD: 5.4 % (ref 4–5.6)
HCT VFR BLD AUTO: 37.1 % (ref 37–48.5)
HDLC SERPL-MCNC: 57 MG/DL (ref 40–75)
HDLC SERPL: 30.3 % (ref 20–50)
HGB BLD-MCNC: 12.1 G/DL (ref 12–16)
LDLC SERPL CALC-MCNC: 115.2 MG/DL (ref 63–159)
MAGNESIUM SERPL-MCNC: 2 MG/DL (ref 1.6–2.6)
MCH RBC QN AUTO: 31.7 PG (ref 27–31)
MCHC RBC AUTO-ENTMCNC: 32.6 G/DL (ref 32–36)
MCV RBC AUTO: 97 FL (ref 82–98)
NONHDLC SERPL-MCNC: 131 MG/DL
PLATELET # BLD AUTO: 206 K/UL (ref 150–450)
PMV BLD AUTO: 10.8 FL (ref 9.2–12.9)
POTASSIUM SERPL-SCNC: 4.1 MMOL/L (ref 3.5–5.1)
RBC # BLD AUTO: 3.82 M/UL (ref 4–5.4)
SODIUM SERPL-SCNC: 140 MMOL/L (ref 136–145)
TRIGL SERPL-MCNC: 79 MG/DL (ref 30–150)
WBC # BLD AUTO: 5.15 K/UL (ref 3.9–12.7)

## 2023-09-08 PROCEDURE — 36415 COLL VENOUS BLD VENIPUNCTURE: CPT

## 2023-09-08 PROCEDURE — 99239 HOSP IP/OBS DSCHRG MGMT >30: CPT | Mod: ,,,

## 2023-09-08 PROCEDURE — 80061 LIPID PANEL: CPT

## 2023-09-08 PROCEDURE — 85027 COMPLETE CBC AUTOMATED: CPT

## 2023-09-08 PROCEDURE — 94761 N-INVAS EAR/PLS OXIMETRY MLT: CPT

## 2023-09-08 PROCEDURE — 83735 ASSAY OF MAGNESIUM: CPT

## 2023-09-08 PROCEDURE — 80048 BASIC METABOLIC PNL TOTAL CA: CPT

## 2023-09-08 PROCEDURE — 83036 HEMOGLOBIN GLYCOSYLATED A1C: CPT

## 2023-09-08 PROCEDURE — 25000003 PHARM REV CODE 250

## 2023-09-08 PROCEDURE — 99239 PR HOSPITAL DISCHARGE DAY,>30 MIN: ICD-10-PCS | Mod: ,,,

## 2023-09-08 PROCEDURE — G0378 HOSPITAL OBSERVATION PER HR: HCPCS

## 2023-09-08 RX ADMIN — ATORVASTATIN CALCIUM 20 MG: 20 TABLET, FILM COATED ORAL at 08:09

## 2023-09-08 NOTE — CARE UPDATE
RAPID RESPONSE NURSE ROUND       Rounding completed with charge RNMarlene for bradycardia reports cardiology consulted, plans to watch pt overnight and follow up with EP. Telemetry orders in place. No additional concerns verbalized at this time. Instructed to call [phone] for further concerns or assistance.

## 2023-09-08 NOTE — HOSPITAL COURSE
Teresa Dennis was admitted to observation for symptomatic bradycardia. Initially presented to ED for L sided CP. BP elevated. CXR negative for acute process. EKG with sinus rhythm with bigeminy PACs; HR 83. Echo: EF 55-60%. While in ED, pt became bradycardic with HR in 30-50s. Cardiology at bedside in ED - recommended observation overnight with outpatient EP follow-up. Multiple episodes of bradycardia with PACs/PVCs overnight with only reported symptom of fatigue. Discussed with on call EP, who also recommended OP f/u. Discharged home with EP and cardiology follow-up.

## 2023-09-08 NOTE — PLAN OF CARE
Problem: Adult Inpatient Plan of Care  Goal: Plan of Care Review  Outcome: Met     Problem: Adult Inpatient Plan of Care  Goal: Absence of Hospital-Acquired Illness or Injury  Outcome: Met     Problem: Adult Inpatient Plan of Care  Goal: Optimal Comfort and Wellbeing  Outcome: Met     Problem: Adult Inpatient Plan of Care  Goal: Readiness for Transition of Care  Outcome: Met     Problem: Infection  Goal: Absence of Infection Signs and Symptoms  Outcome: Met     Problem: Hypertension Comorbidity  Goal: Blood Pressure in Desired Range  Outcome: Met     Problem: Seizure Disorder Comorbidity  Goal: Maintenance of Seizure Control  Outcome: Met

## 2023-09-08 NOTE — NURSING
Report received from ROSA Chandra. Assumed care. Pt resting comfortably in bed. Bed locked and in lowest position, side rails up x2, call light in reach. communication board upated. Will continue with POC.

## 2023-09-08 NOTE — PLAN OF CARE
[facility] - Observation 11H  Discharge Final Note    Primary Care Provider: No, Primary Doctor    Expected Discharge Date: 9/8/2023    Future Appointments   Date Time Provider Department Center   9/11/2023  2:00 PM EKG, APPT [facility] EKG [facility]   9/11/2023  2:40 PM Jose Verma MD [facility] ARRHYTH [facility]   9/21/2023  9:20 AM Leopoldo Comer Jr., MD [facility] CARDIO [place]     Pt discharged home with no services.  Jaiden Cornell RN,BSN        Final Discharge Note (most recent)       Final Note - 09/08/23 1057          Final Note    Assessment Type Final Discharge Note     Anticipated Discharge Disposition Home or Self Care     Hospital Resources/Appts/Education Provided Provided patient/caregiver with written discharge plan information;Post-Acute resouces added to AVS;Appointments scheduled and added to AVS        Post-Acute Status    Discharge Delays None known at this time                     Important Message from Medicare

## 2023-09-08 NOTE — NURSING
Discharged home IV removed with catheter intact, telemetry box TTX 2758 removed and returned. Discharge instructions provided to patient. Ambulated off unit with no signs of distress noted.

## 2023-09-11 ENCOUNTER — OFFICE VISIT (OUTPATIENT)
Dept: ELECTROPHYSIOLOGY | Facility: CLINIC | Age: 68
End: 2023-09-11
Payer: COMMERCIAL

## 2023-09-11 ENCOUNTER — HOSPITAL ENCOUNTER (OUTPATIENT)
Dept: CARDIOLOGY | Facility: CLINIC | Age: 68
Discharge: HOME | End: 2023-09-11
Payer: COMMERCIAL

## 2023-09-11 VITALS
BODY MASS INDEX: 29.1 KG/M2 | HEIGHT: 61 IN | SYSTOLIC BLOOD PRESSURE: 118 MMHG | DIASTOLIC BLOOD PRESSURE: 72 MMHG | HEART RATE: 47 BPM | WEIGHT: 154.13 LBS

## 2023-09-11 DIAGNOSIS — E78.00 PURE HYPERCHOLESTEROLEMIA: Chronic | ICD-10-CM

## 2023-09-11 DIAGNOSIS — G40.909 SEIZURE DISORDER: ICD-10-CM

## 2023-09-11 DIAGNOSIS — R00.1 SYMPTOMATIC BRADYCARDIA: ICD-10-CM

## 2023-09-11 DIAGNOSIS — I49.8 OTHER SPECIFIED CARDIAC ARRHYTHMIAS: ICD-10-CM

## 2023-09-11 DIAGNOSIS — I49.1 PREMATURE ATRIAL CONTRACTIONS: Primary | Chronic | ICD-10-CM

## 2023-09-11 LAB
OHS CV EVENT MONITOR DAY: 0
OHS CV HOLTER LENGTH DECIMAL HOURS: 47.98
OHS CV HOLTER LENGTH HOURS: 47
OHS CV HOLTER LENGTH MINUTES: 59
OHS CV HOLTER SINUS AVERAGE HR: 68
OHS CV HOLTER SINUS MAX HR: 130
OHS CV HOLTER SINUS MIN HR: 33

## 2023-09-11 PROCEDURE — 1101F PR PT FALLS ASSESS DOC 0-1 FALLS W/OUT INJ PAST YR: ICD-10-PCS | Mod: CPTII,S$GLB,, | Performed by: INTERNAL MEDICINE

## 2023-09-11 PROCEDURE — 99205 OFFICE O/P NEW HI 60 MIN: CPT | Mod: S$GLB,,, | Performed by: INTERNAL MEDICINE

## 2023-09-11 PROCEDURE — 3044F PR MOST RECENT HEMOGLOBIN A1C LEVEL <7.0%: ICD-10-PCS | Mod: CPTII,S$GLB,, | Performed by: INTERNAL MEDICINE

## 2023-09-11 PROCEDURE — 3008F BODY MASS INDEX DOCD: CPT | Mod: CPTII,S$GLB,, | Performed by: INTERNAL MEDICINE

## 2023-09-11 PROCEDURE — 99999 PR PBB SHADOW E&M-EST. PATIENT-LVL III: CPT | Mod: PBBFAC,,, | Performed by: INTERNAL MEDICINE

## 2023-09-11 PROCEDURE — 1159F MED LIST DOCD IN RCRD: CPT | Mod: CPTII,S$GLB,, | Performed by: INTERNAL MEDICINE

## 2023-09-11 PROCEDURE — 3074F PR MOST RECENT SYSTOLIC BLOOD PRESSURE < 130 MM HG: ICD-10-PCS | Mod: CPTII,S$GLB,, | Performed by: INTERNAL MEDICINE

## 2023-09-11 PROCEDURE — 3074F SYST BP LT 130 MM HG: CPT | Mod: CPTII,S$GLB,, | Performed by: INTERNAL MEDICINE

## 2023-09-11 PROCEDURE — 99999 PR PBB SHADOW E&M-EST. PATIENT-LVL III: ICD-10-PCS | Mod: PBBFAC,,, | Performed by: INTERNAL MEDICINE

## 2023-09-11 PROCEDURE — 93010 ELECTROCARDIOGRAM REPORT: CPT | Mod: S$GLB,,, | Performed by: INTERNAL MEDICINE

## 2023-09-11 PROCEDURE — 3078F PR MOST RECENT DIASTOLIC BLOOD PRESSURE < 80 MM HG: ICD-10-PCS | Mod: CPTII,S$GLB,, | Performed by: INTERNAL MEDICINE

## 2023-09-11 PROCEDURE — 3288F FALL RISK ASSESSMENT DOCD: CPT | Mod: CPTII,S$GLB,, | Performed by: INTERNAL MEDICINE

## 2023-09-11 PROCEDURE — 3008F PR BODY MASS INDEX (BMI) DOCUMENTED: ICD-10-PCS | Mod: CPTII,S$GLB,, | Performed by: INTERNAL MEDICINE

## 2023-09-11 PROCEDURE — 1101F PT FALLS ASSESS-DOCD LE1/YR: CPT | Mod: CPTII,S$GLB,, | Performed by: INTERNAL MEDICINE

## 2023-09-11 PROCEDURE — 1160F RVW MEDS BY RX/DR IN RCRD: CPT | Mod: CPTII,S$GLB,, | Performed by: INTERNAL MEDICINE

## 2023-09-11 PROCEDURE — 99205 PR OFFICE/OUTPT VISIT, NEW, LEVL V, 60-74 MIN: ICD-10-PCS | Mod: S$GLB,,, | Performed by: INTERNAL MEDICINE

## 2023-09-11 PROCEDURE — 93010 RHYTHM STRIP: ICD-10-PCS | Mod: S$GLB,,, | Performed by: INTERNAL MEDICINE

## 2023-09-11 PROCEDURE — 1160F PR REVIEW ALL MEDS BY PRESCRIBER/CLIN PHARMACIST DOCUMENTED: ICD-10-PCS | Mod: CPTII,S$GLB,, | Performed by: INTERNAL MEDICINE

## 2023-09-11 PROCEDURE — 3078F DIAST BP <80 MM HG: CPT | Mod: CPTII,S$GLB,, | Performed by: INTERNAL MEDICINE

## 2023-09-11 PROCEDURE — 93005 ELECTROCARDIOGRAM TRACING: CPT | Mod: S$GLB,,, | Performed by: INTERNAL MEDICINE

## 2023-09-11 PROCEDURE — 1126F AMNT PAIN NOTED NONE PRSNT: CPT | Mod: CPTII,S$GLB,, | Performed by: INTERNAL MEDICINE

## 2023-09-11 PROCEDURE — 3044F HG A1C LEVEL LT 7.0%: CPT | Mod: CPTII,S$GLB,, | Performed by: INTERNAL MEDICINE

## 2023-09-11 PROCEDURE — 1126F PR PAIN SEVERITY QUANTIFIED, NO PAIN PRESENT: ICD-10-PCS | Mod: CPTII,S$GLB,, | Performed by: INTERNAL MEDICINE

## 2023-09-11 PROCEDURE — 1159F PR MEDICATION LIST DOCUMENTED IN MEDICAL RECORD: ICD-10-PCS | Mod: CPTII,S$GLB,, | Performed by: INTERNAL MEDICINE

## 2023-09-11 PROCEDURE — 93005 RHYTHM STRIP: ICD-10-PCS | Mod: S$GLB,,, | Performed by: INTERNAL MEDICINE

## 2023-09-11 PROCEDURE — 3288F PR FALLS RISK ASSESSMENT DOCUMENTED: ICD-10-PCS | Mod: CPTII,S$GLB,, | Performed by: INTERNAL MEDICINE

## 2023-09-11 RX ORDER — RISEDRONATE SODIUM 35 MG/1
35 TABLET, FILM COATED ORAL
COMMUNITY

## 2023-09-11 RX ORDER — OXYBUTYNIN CHLORIDE 5 MG/1
5 TABLET, EXTENDED RELEASE ORAL DAILY
COMMUNITY

## 2023-09-11 NOTE — PROGRESS NOTES
Subjective:     HPI    I had the pleasure of seeing Teresa Dennis in consultation at your request for the evaluation of ectopy. She is a 68F with seizure disorder who was well until 8/2023 when she began to note worsening fatigue. She was also receiving intermittent alarms on her AppleWatch that her HR was in the 30-40s bpm range. In 9/2023 she presented to the ED, was found to be in atrial bigeminy, and started on .Echo showed EF 55-60%, mod LAE.     Notably, pt was at work as a psych nurse. Two weeks ago she was feeling tired at work. Another nurse took vitals, which were notable for HR in the 30s bpm range.    A 48 hr Holter performed in 8/2023 showed sinus rhythm average HR 68 bpm (range  bpm), PAC burden 12.4%, PVC burden 2.2%, no sustained arrhythmias. Multiple HRs in the 30s bpm range occurred during the daytime.    Pt states she started Weight Watchers in 8/2023 and has lost 10-14 lbs since then. Pt had COVID in 7/2023.    My interpretation of today's ECG is sinus rhythm 47 bpm. Pt feels fine right now.    Review of Systems   Constitutional: Positive for malaise/fatigue. Negative for decreased appetite, weight gain and weight loss.   HENT:  Negative for sore throat.    Eyes:  Negative for blurred vision.   Cardiovascular:  Negative for chest pain, dyspnea on exertion, irregular heartbeat, leg swelling, near-syncope, orthopnea, palpitations, paroxysmal nocturnal dyspnea and syncope.   Respiratory:  Negative for shortness of breath.    Skin:  Negative for rash.   Musculoskeletal:  Negative for arthritis.   Gastrointestinal:  Negative for abdominal pain.   Neurological:  Negative for focal weakness.   Psychiatric/Behavioral:  Negative for altered mental status.        Objective:   Physical Exam  Constitutional:       General: She is not in acute distress.     Appearance: She is well-developed.   HENT:      Head: Normocephalic and atraumatic.   Eyes:      General: No scleral icterus.      Conjunctiva/sclera: Conjunctivae normal.      Pupils: Pupils are equal, round, and reactive to light.   Neck:      Thyroid: No thyromegaly.      Vascular: No JVD.   Cardiovascular:      Rate and Rhythm: Normal rate and regular rhythm.      Pulses: Intact distal pulses.      Heart sounds: Normal heart sounds. No murmur heard.     No friction rub. No gallop.   Pulmonary:      Effort: Pulmonary effort is normal. No respiratory distress.      Breath sounds: Normal breath sounds.   Abdominal:      General: Bowel sounds are normal. There is no distension.      Palpations: Abdomen is soft.   Musculoskeletal:      Cervical back: Normal range of motion and neck supple.   Skin:     General: Skin is warm and dry.   Neurological:      Mental Status: She is alert and oriented to person, place, and time.   Psychiatric:         Behavior: Behavior normal.         Assessment:      1. Premature atrial contractions    2. Seizure disorder    3. Pure hypercholesterolemia    4. Symptomatic bradycardia        Plan:     In summary, Teresa Dennis is a 68F with a history of symptomatic bradycardia. Based on my review of the Holter monitor, I think her symptoms are not due to ectopy but rather sinus node dysfunction/SSS.    I explained to the patient the risks, benefits, indications, and alternatives to dual chamber pacemaker implantation. After considering her options, she has decided to hold the course for now in hopes her Weight Watchers diet or recent COVID infection may be playing a role.    Plan is for a 24 hr Holter in 2 months, follow-up 3 months.    Thank you for allowing me to participate in the care of this patient. Please do not hesitate to call me with any questions or concerns.  .

## 2023-09-12 NOTE — DISCHARGE SUMMARY
"[facility] - Observation [address] [place] [address] Medicine  Discharge Summary      Patient Name: Teresa Dennis  MRN: [id number]  RADHA: [id number]  Patient Class: OP- Observation  Admission Date: 9/7/2023  Hospital Length of Stay: 0 days  Discharge Date and Time: 9/8/2023 10:46 AM  Attending Physician: Zeinab att. providers found   Discharging Provider: Judi Allen PA-C  Primary Care Provider: Zeinab Primary Doctor  [facility] Medicine Team: [facility] MED E Judi Allen PA-C  Primary Care Team: [facility] MED E    HPI:   Teresa Dennis is a 69 yo F with PMHx of HTN, HLD, PACs, and seizure disorder who presented to the ED for intermittent L sided chest pain. Patient states the her chest pain began about 5 days ago, but worsened today while she was exercising. Pain is nonradiating and worse with movement. Patient states that "it feels like a pulled muscle."  She states that about two weeks ago while at work her Apple watch was informing her that her HR was dropping below <40bpm for minutes at a time. She informed her cardiologist about watch alerts and was placed on 48hr Holter monitor, which showed frequent PACs/PVCs. She was on metoprolol 50 mg daily, which was stopped on Tuesday for her bradycardia. The only symptom she reports with these episodes is fatigue when at rest. Denies fever, chills, syncope, lightheadedness, SOB, cough, abdominal pain, n/v/d, dysuria, and LE swelling.     In ED: Afebrile. HR 30-50s. BP elevated. EKG showed NSR with bigeminy PACs @83bmp. CXR showed normal heart size, lungs are clear, no pleural effusion.  - no previous baseline. Trop <0.006 --> 0.013. D-dimer 0.25. No leukocytosis. Electrolyte levels within normal limits. Cardiology consulted. Given aspirin 325mg po x1 in ED. Placed in observation for further evaluation.      * No surgery found *      Hospital Course:   Teresa Dennis was admitted to observation for symptomatic bradycardia. Initially presented to ED for L sided CP. BP elevated. " CXR negative for acute process. EKG with sinus rhythm with bigeminy PACs; HR 83. Echo: EF 55-60%. While in ED, pt became bradycardic with HR in 30-50s. Cardiology at bedside in ED - recommended observation overnight with outpatient EP follow-up. Multiple episodes of bradycardia with PACs/PVCs overnight with only reported symptom of fatigue. Discussed with on call EP, who also recommended OP f/u. Discharged home with EP and cardiology follow-up.        Goals of Care Treatment Preferences:  Code Status: Full Code      Consults:   Consults (From admission, onward)        Status Ordering Provider     Inpatient consult to Cardiology  Once        Provider:  (Not yet assigned)    Completed RADHA ORNELAS          Final Active Diagnoses:    Diagnosis Date Noted POA    PRINCIPAL PROBLEM:  Symptomatic bradycardia [R00.1] 08/25/2023 Yes    Pure hypercholesterolemia [E78.00] 10/21/2021 Yes     Chronic    Premature atrial contractions [I49.1]  Yes     Chronic    Seizure disorder [G40.909]  Yes      Problems Resolved During this Admission:       Discharged Condition: good    Disposition: Home or Self Care    Follow Up:    Patient Instructions:      Diet Adult Regular     Notify your health care provider if you experience any of the following:  persistent dizziness, light-headedness, or visual disturbances     Notify your health care provider if you experience any of the following:  increased confusion or weakness     Activity as tolerated       Significant Diagnostic Studies: N/A    Pending Diagnostic Studies:     None         Medications:  Reconciled Home Medications:      Medication List      CHANGE how you take these medications    carBAMazepine 300 MG Cm12  Commonly known as: CARBATROL  TAKE 1 CAPSULE BY MOUTH TWICE A DAY  What changed: when to take this        CONTINUE taking these medications    atorvastatin 20 MG tablet  Commonly known as: LIPITOR  TAKE 1 TABLET BY MOUTH EVERY DAY     ergocalciferol 50,000 unit  Cap  Commonly known as: ERGOCALCIFEROL  Take 1 capsule by mouth once a week.     loratadine 10 mg tablet  Commonly known as: CLARITIN  Take 10 mg by mouth daily as needed for Allergies.     PEPCID AC ORAL  Take 1 tablet by mouth daily as needed (Heartburn).        STOP taking these medications    metoprolol succinate 50 MG 24 hr tablet  Commonly known as: TOPROL-XL            Indwelling Lines/Drains at time of discharge:   Lines/Drains/Airways     None                 Time spent on the discharge of patient: 35 minutes         Judi Allen PA-C  Department of Hospital Medicine  [facility] - Observation 11H

## 2023-09-21 ENCOUNTER — OFFICE VISIT (OUTPATIENT)
Dept: CARDIOLOGY | Facility: CLINIC | Age: 68
End: 2023-09-21
Payer: COMMERCIAL

## 2023-09-21 VITALS
SYSTOLIC BLOOD PRESSURE: 132 MMHG | DIASTOLIC BLOOD PRESSURE: 55 MMHG | HEART RATE: 39 BPM | WEIGHT: 154 LBS | HEIGHT: 61 IN | BODY MASS INDEX: 29.07 KG/M2

## 2023-09-21 DIAGNOSIS — I49.1 PREMATURE ATRIAL CONTRACTIONS: Chronic | ICD-10-CM

## 2023-09-21 DIAGNOSIS — R00.1 SYMPTOMATIC BRADYCARDIA: Primary | Chronic | ICD-10-CM

## 2023-09-21 DIAGNOSIS — E78.00 PURE HYPERCHOLESTEROLEMIA: Chronic | ICD-10-CM

## 2023-09-21 PROCEDURE — 1159F PR MEDICATION LIST DOCUMENTED IN MEDICAL RECORD: ICD-10-PCS | Mod: CPTII,S$GLB,, | Performed by: INTERNAL MEDICINE

## 2023-09-21 PROCEDURE — 3044F PR MOST RECENT HEMOGLOBIN A1C LEVEL <7.0%: ICD-10-PCS | Mod: CPTII,S$GLB,, | Performed by: INTERNAL MEDICINE

## 2023-09-21 PROCEDURE — 3288F PR FALLS RISK ASSESSMENT DOCUMENTED: ICD-10-PCS | Mod: CPTII,S$GLB,, | Performed by: INTERNAL MEDICINE

## 2023-09-21 PROCEDURE — 1126F PR PAIN SEVERITY QUANTIFIED, NO PAIN PRESENT: ICD-10-PCS | Mod: CPTII,S$GLB,, | Performed by: INTERNAL MEDICINE

## 2023-09-21 PROCEDURE — 1126F AMNT PAIN NOTED NONE PRSNT: CPT | Mod: CPTII,S$GLB,, | Performed by: INTERNAL MEDICINE

## 2023-09-21 PROCEDURE — 3078F DIAST BP <80 MM HG: CPT | Mod: CPTII,S$GLB,, | Performed by: INTERNAL MEDICINE

## 2023-09-21 PROCEDURE — 3288F FALL RISK ASSESSMENT DOCD: CPT | Mod: CPTII,S$GLB,, | Performed by: INTERNAL MEDICINE

## 2023-09-21 PROCEDURE — 99999 PR PBB SHADOW E&M-EST. PATIENT-LVL III: CPT | Mod: PBBFAC,,, | Performed by: INTERNAL MEDICINE

## 2023-09-21 PROCEDURE — 99214 OFFICE O/P EST MOD 30 MIN: CPT | Mod: S$GLB,,, | Performed by: INTERNAL MEDICINE

## 2023-09-21 PROCEDURE — 99214 PR OFFICE/OUTPT VISIT, EST, LEVL IV, 30-39 MIN: ICD-10-PCS | Mod: S$GLB,,, | Performed by: INTERNAL MEDICINE

## 2023-09-21 PROCEDURE — 3008F PR BODY MASS INDEX (BMI) DOCUMENTED: ICD-10-PCS | Mod: CPTII,S$GLB,, | Performed by: INTERNAL MEDICINE

## 2023-09-21 PROCEDURE — 3075F PR MOST RECENT SYSTOLIC BLOOD PRESS GE 130-139MM HG: ICD-10-PCS | Mod: CPTII,S$GLB,, | Performed by: INTERNAL MEDICINE

## 2023-09-21 PROCEDURE — 1159F MED LIST DOCD IN RCRD: CPT | Mod: CPTII,S$GLB,, | Performed by: INTERNAL MEDICINE

## 2023-09-21 PROCEDURE — 3008F BODY MASS INDEX DOCD: CPT | Mod: CPTII,S$GLB,, | Performed by: INTERNAL MEDICINE

## 2023-09-21 PROCEDURE — 3044F HG A1C LEVEL LT 7.0%: CPT | Mod: CPTII,S$GLB,, | Performed by: INTERNAL MEDICINE

## 2023-09-21 PROCEDURE — 99999 PR PBB SHADOW E&M-EST. PATIENT-LVL III: ICD-10-PCS | Mod: PBBFAC,,, | Performed by: INTERNAL MEDICINE

## 2023-09-21 PROCEDURE — 3078F PR MOST RECENT DIASTOLIC BLOOD PRESSURE < 80 MM HG: ICD-10-PCS | Mod: CPTII,S$GLB,, | Performed by: INTERNAL MEDICINE

## 2023-09-21 PROCEDURE — 1101F PT FALLS ASSESS-DOCD LE1/YR: CPT | Mod: CPTII,S$GLB,, | Performed by: INTERNAL MEDICINE

## 2023-09-21 PROCEDURE — 1101F PR PT FALLS ASSESS DOC 0-1 FALLS W/OUT INJ PAST YR: ICD-10-PCS | Mod: CPTII,S$GLB,, | Performed by: INTERNAL MEDICINE

## 2023-09-21 PROCEDURE — 3075F SYST BP GE 130 - 139MM HG: CPT | Mod: CPTII,S$GLB,, | Performed by: INTERNAL MEDICINE

## 2023-09-21 RX ORDER — LANOLIN ALCOHOL/MO/W.PET/CERES
CREAM (GRAM) TOPICAL
Refills: 0
Start: 2023-09-21

## 2023-09-21 NOTE — PROGRESS NOTES
Subjective:   2023     Patient ID:  Teresa Dennis is a 68 y.o. female who presents for evaulation of Follow-up        Patient with PACs/PVCs and dyslipidemia  here for cardiac follow-up.  She has no complaints of chest pains tightness, PND or orthopnea.  She does not have dyspnea on exertion.  She has been walking regularly.  She has not been having palpitations.  Did episode symptomatic bradycardia several weeks ago, Holter monitor showed very frequent PACs and PVCs.  She continues to be symptomatic and was seen by EP, felt that she was having symptomatic bradycardia, permanent pacemaker was recommended, she is feeling better now and, will follow-up after another Holter with EP.    She does have positive family history for congestive heart failure.  Hypercholesterolemia is treated with moderate dose statin therapy.  Lipid profile in  shows total cholesterol 188, HDL 57, , triglycerides 79. .    She is doing well with no new symptoms or cardiovascular complaints and no change in exercise capacity.  She denies chest discomfort, HENLEY, palpitations, PND/orthopnea, lightheadedness and syncope.     Her father had AF and  of CHF.     She only smoked in high school.     Response after blood work in 2021:  Blood work looks great.  Your total cholesterol is mildly elevated, but you have a high HDL and a low triglyceride.  Your LDL is 106. I think that continuing you on moderate dose statin therapy with atorvastatin as currently prescribed is adequate.     Please let me know if you have any questions.           Past Medical History:   Diagnosis Date    Premature atrial contractions     Seizure disorder        Review of patient's allergies indicates:  No Known Allergies      Current Outpatient Medications:     atorvastatin (LIPITOR) 20 MG tablet, TAKE 1 TABLET BY MOUTH EVERY DAY, Disp: 90 tablet, Rfl: 3    carBAMazepine (CARBATROL) 300 MG CM12, TAKE 1 CAPSULE BY MOUTH TWICE A DAY (Patient  taking differently: Take 300 mg by mouth once daily.), Disp: 180 capsule, Rfl: 1    ergocalciferol (ERGOCALCIFEROL) 50,000 unit Cap, Take 1 capsule by mouth once a week., Disp: , Rfl:     famotidine (PEPCID AC ORAL), Take 1 tablet by mouth daily as needed (Heartburn)., Disp: , Rfl:     loratadine (CLARITIN) 10 mg tablet, Take 10 mg by mouth daily as needed for Allergies., Disp: , Rfl:     oxybutynin (DITROPAN-XL) 5 MG TR24, Take 5 mg by mouth once daily., Disp: , Rfl:     risedronate (ACTONEL) 35 MG tablet, Take 35 mg by mouth every 7 days., Disp: , Rfl:     magnesium oxide (MAG-OX) 400 mg (241.3 mg magnesium) tablet, Take 1 daily; if diarrhea occurs, decrease dose, Disp: , Rfl: 0     Objective:   Review of Systems   Cardiovascular:  Negative for chest pain, claudication, cyanosis, dyspnea on exertion, irregular heartbeat, leg swelling, near-syncope, orthopnea, palpitations, paroxysmal nocturnal dyspnea and syncope.       Vitals:    09/21/23 0906   BP: (!) 132/55   Pulse: (!) 39         Physical Exam  Vitals reviewed.   Constitutional:       General: She is not in acute distress.     Appearance: She is well-developed.   HENT:      Head: Normocephalic and atraumatic.      Nose: Nose normal.   Eyes:      Conjunctiva/sclera: Conjunctivae normal.      Pupils: Pupils are equal, round, and reactive to light.   Neck:      Vascular: No JVD.   Cardiovascular:      Rate and Rhythm: Normal rate and regular rhythm.      Pulses: Intact distal pulses.      Heart sounds: Normal heart sounds. No murmur heard.     No friction rub. No gallop.   Pulmonary:      Effort: Pulmonary effort is normal. No respiratory distress.      Breath sounds: Normal breath sounds. No wheezing or rales.   Chest:      Chest wall: No tenderness.   Abdominal:      General: Bowel sounds are normal. There is no distension.      Palpations: Abdomen is soft.      Tenderness: There is no abdominal tenderness.   Musculoskeletal:         General: No tenderness or  deformity. Normal range of motion.      Cervical back: Normal range of motion and neck supple.   Skin:     General: Skin is warm and dry.      Findings: No erythema or rash.   Neurological:      Mental Status: She is alert and oriented to person, place, and time.      Cranial Nerves: No cranial nerve deficit.      Motor: No abnormal muscle tone.      Coordination: Coordination normal.   Psychiatric:         Behavior: Behavior normal.         Thought Content: Thought content normal.         Judgment: Judgment normal.       EKG shows sinus rhythm, sinus arrhythmia, normal EKG                  Assessment and Plan:     Symptomatic bradycardia  Comments:  Currently has minimal symptoms despite continued frequent PACs on exam  Follow-up with EP, pacemaker as needed    Pure hypercholesterolemia  Comments:  Last LDL cholesterol remain mildly elevated, would continue moderate dose statin therapy.    Premature atrial contractions  Comments:  Magnesium oxide 400 mg daily.  Orders:  -     magnesium oxide (MAG-OX) 400 mg (241.3 mg magnesium) tablet; Take 1 daily; if diarrhea occurs, decrease dose; Refill: 0            No follow-ups on file.

## 2023-10-19 ENCOUNTER — PATIENT MESSAGE (OUTPATIENT)
Dept: ELECTROPHYSIOLOGY | Facility: CLINIC | Age: 68
End: 2023-10-19
Payer: COMMERCIAL

## 2023-11-08 ENCOUNTER — PATIENT MESSAGE (OUTPATIENT)
Dept: ELECTROPHYSIOLOGY | Facility: CLINIC | Age: 68
End: 2023-11-08
Payer: COMMERCIAL

## 2023-11-15 ENCOUNTER — HOSPITAL ENCOUNTER (OUTPATIENT)
Dept: CARDIOLOGY | Facility: HOSPITAL | Age: 68
Discharge: HOME | End: 2023-11-15
Attending: INTERNAL MEDICINE
Payer: COMMERCIAL

## 2023-11-15 DIAGNOSIS — R00.1 SYMPTOMATIC BRADYCARDIA: ICD-10-CM

## 2023-11-15 DIAGNOSIS — I49.1 PREMATURE ATRIAL CONTRACTIONS: Chronic | ICD-10-CM

## 2023-11-15 PROCEDURE — 93227 HOLTER MONITOR - 24 HOUR (CUPID ONLY): ICD-10-PCS | Mod: ,,, | Performed by: STUDENT IN AN ORGANIZED HEALTH CARE EDUCATION/TRAINING PROGRAM

## 2023-11-15 PROCEDURE — 93227 XTRNL ECG REC<48 HR R&I: CPT | Mod: ,,, | Performed by: STUDENT IN AN ORGANIZED HEALTH CARE EDUCATION/TRAINING PROGRAM

## 2023-11-15 PROCEDURE — 93225 XTRNL ECG REC<48 HRS REC: CPT

## 2023-11-16 LAB
OHS CV EVENT MONITOR DAY: 0
OHS CV HOLTER LENGTH DECIMAL HOURS: 23.98
OHS CV HOLTER LENGTH HOURS: 23
OHS CV HOLTER LENGTH MINUTES: 59
OHS CV HOLTER SINUS AVERAGE HR: 82
OHS CV HOLTER SINUS MAX HR: 132
OHS CV HOLTER SINUS MIN HR: 41

## 2024-02-22 ENCOUNTER — PATIENT MESSAGE (OUTPATIENT)
Dept: CARDIOLOGY | Facility: CLINIC | Age: 69
End: 2024-02-22
Payer: COMMERCIAL

## 2024-02-23 ENCOUNTER — TELEPHONE (OUTPATIENT)
Dept: CARDIOLOGY | Facility: CLINIC | Age: 69
End: 2024-02-23
Payer: COMMERCIAL

## 2024-02-23 DIAGNOSIS — I20.89 ANGINAL EQUIVALENT: Primary | ICD-10-CM

## 2024-02-23 DIAGNOSIS — R00.1 SYMPTOMATIC BRADYCARDIA: Chronic | ICD-10-CM

## 2024-02-29 ENCOUNTER — HOSPITAL ENCOUNTER (OUTPATIENT)
Dept: CARDIOLOGY | Facility: HOSPITAL | Age: 69
Discharge: HOME | End: 2024-02-29
Attending: INTERNAL MEDICINE
Payer: COMMERCIAL

## 2024-02-29 DIAGNOSIS — I20.89 ANGINAL EQUIVALENT: ICD-10-CM

## 2024-02-29 DIAGNOSIS — R00.1 SYMPTOMATIC BRADYCARDIA: Chronic | ICD-10-CM

## 2024-02-29 LAB
ASCENDING AORTA: 3.08 CM
CV ECHO LV RWT: 0.33 CM
CV STRESS BASE HR: 37 BPM
DIASTOLIC BLOOD PRESSURE: 76 MMHG
DOP CALC LVOT AREA: 3.5 CM2
DOP CALC LVOT DIAMETER: 2.12 CM
DOP CALC LVOT PEAK VEL: 1.05 M/S
DOP CALC LVOT STROKE VOLUME: 79.91 CM3
DOP CALCLVOT PEAK VEL VTI: 22.65 CM
E WAVE DECELERATION TIME: 390.67 MSEC
E/A RATIO: 1.11
ECHO LV POSTERIOR WALL: 0.86 CM (ref 0.6–1.1)
FRACTIONAL SHORTENING: 36 % (ref 28–44)
INTERVENTRICULAR SEPTUM: 0.88 CM (ref 0.6–1.1)
LA MAJOR: 4.92 CM
LA MINOR: 4.77 CM
LA WIDTH: 3.84 CM
LEFT ATRIUM SIZE: 3.99 CM
LEFT ATRIUM VOLUME MOD: 70.2 CM3
LEFT ATRIUM VOLUME: 63.08 CM3
LEFT INTERNAL DIMENSION IN SYSTOLE: 3.35 CM (ref 2.1–4)
LEFT VENTRICLE DIASTOLIC VOLUME: 130.85 ML
LEFT VENTRICLE SYSTOLIC VOLUME: 45.88 ML
LEFT VENTRICULAR INTERNAL DIMENSION IN DIASTOLE: 5.22 CM (ref 3.5–6)
LEFT VENTRICULAR MASS: 162.77 G
MV A" WAVE DURATION": 13.32 MSEC
MV PEAK A VEL: 0.45 M/S
MV PEAK E VEL: 0.5 M/S
MV STENOSIS PRESSURE HALF TIME: 113.29 MS
MV VALVE AREA P 1/2 METHOD: 1.94 CM2
OHS CV CPX 1 MINUTE RECOVERY HEART RATE: 97 BPM
OHS CV CPX 85 PERCENT MAX PREDICTED HEART RATE MALE: 131
OHS CV CPX ESTIMATED METS: 7
OHS CV CPX MAX PREDICTED HEART RATE: 154
OHS CV CPX PATIENT IS FEMALE: 1
OHS CV CPX PATIENT IS MALE: 0
OHS CV CPX PEAK DIASTOLIC BLOOD PRESSURE: 87 MMHG
OHS CV CPX PEAK HEAR RATE: 134 BPM
OHS CV CPX PEAK RATE PRESSURE PRODUCT: NORMAL
OHS CV CPX PEAK SYSTOLIC BLOOD PRESSURE: 167 MMHG
OHS CV CPX PERCENT MAX PREDICTED HEART RATE ACHIEVED: 91
OHS CV CPX RATE PRESSURE PRODUCT PRESENTING: 4329
PISA TR MAX VEL: 3.16 M/S
PULM VEIN S/D RATIO: 0.6
PV PEAK D VEL: 0.47 M/S
PV PEAK S VEL: 0.28 M/S
RA MAJOR: 4.65 CM
RA PRESSURE ESTIMATED: 3 MMHG
RA WIDTH: 3.22 CM
RIGHT VENTRICULAR END-DIASTOLIC DIMENSION: 2.91 CM
RV TB RVSP: 6 MMHG
SINUS: 2.88 CM
STJ: 2.66 CM
STRESS ECHO POST EXERCISE DUR MIN: 11 MINUTES
STRESS ECHO POST EXERCISE DUR SEC: 30 SECONDS
SYSTOLIC BLOOD PRESSURE: 117 MMHG
TR MAX PG: 40 MMHG
TRICUSPID ANNULAR PLANE SYSTOLIC EXCURSION: 1.61 CM
TV REST PULMONARY ARTERY PRESSURE: 43 MMHG

## 2024-02-29 PROCEDURE — 93351 STRESS TTE COMPLETE: CPT

## 2024-02-29 PROCEDURE — 93351 STRESS TTE COMPLETE: CPT | Mod: 26,,, | Performed by: INTERNAL MEDICINE

## 2024-03-01 ENCOUNTER — OFFICE VISIT (OUTPATIENT)
Dept: CARDIOLOGY | Facility: CLINIC | Age: 69
End: 2024-03-01
Payer: COMMERCIAL

## 2024-03-01 VITALS
DIASTOLIC BLOOD PRESSURE: 61 MMHG | HEIGHT: 61 IN | HEART RATE: 42 BPM | WEIGHT: 153 LBS | SYSTOLIC BLOOD PRESSURE: 160 MMHG | BODY MASS INDEX: 28.89 KG/M2

## 2024-03-01 DIAGNOSIS — R94.39 ABNORMAL STRESS TEST: Primary | ICD-10-CM

## 2024-03-01 DIAGNOSIS — I20.89 ANGINAL EQUIVALENT: ICD-10-CM

## 2024-03-01 DIAGNOSIS — E78.00 PURE HYPERCHOLESTEROLEMIA: Chronic | ICD-10-CM

## 2024-03-01 DIAGNOSIS — R06.81 WITNESSED EPISODE OF APNEA: ICD-10-CM

## 2024-03-01 DIAGNOSIS — I49.1 PREMATURE ATRIAL CONTRACTIONS: Chronic | ICD-10-CM

## 2024-03-01 DIAGNOSIS — R00.1 SYMPTOMATIC BRADYCARDIA: Chronic | ICD-10-CM

## 2024-03-01 PROCEDURE — 99214 OFFICE O/P EST MOD 30 MIN: CPT | Mod: S$GLB,,, | Performed by: INTERNAL MEDICINE

## 2024-03-01 PROCEDURE — 3078F DIAST BP <80 MM HG: CPT | Mod: CPTII,S$GLB,, | Performed by: INTERNAL MEDICINE

## 2024-03-01 PROCEDURE — 3008F BODY MASS INDEX DOCD: CPT | Mod: CPTII,S$GLB,, | Performed by: INTERNAL MEDICINE

## 2024-03-01 PROCEDURE — 3077F SYST BP >= 140 MM HG: CPT | Mod: CPTII,S$GLB,, | Performed by: INTERNAL MEDICINE

## 2024-03-01 PROCEDURE — 1101F PT FALLS ASSESS-DOCD LE1/YR: CPT | Mod: CPTII,S$GLB,, | Performed by: INTERNAL MEDICINE

## 2024-03-01 PROCEDURE — 3288F FALL RISK ASSESSMENT DOCD: CPT | Mod: CPTII,S$GLB,, | Performed by: INTERNAL MEDICINE

## 2024-03-01 PROCEDURE — 99999 PR PBB SHADOW E&M-EST. PATIENT-LVL III: CPT | Mod: PBBFAC,,, | Performed by: INTERNAL MEDICINE

## 2024-03-01 RX ORDER — SODIUM CHLORIDE 0.9 % (FLUSH) 0.9 %
10 SYRINGE (ML) INJECTION
Status: CANCELLED | OUTPATIENT
Start: 2024-03-01

## 2024-03-01 RX ORDER — SODIUM CHLORIDE 9 MG/ML
INJECTION, SOLUTION INTRAVENOUS CONTINUOUS
Status: CANCELLED | OUTPATIENT
Start: 2024-03-01 | End: 2024-03-01

## 2024-03-01 NOTE — H&P (VIEW-ONLY)
Subjective:   2024     Patient ID:  Teresa Dennis is a 68 y.o. female who presents for evaulation of Results and Shortness of Breath        Patient did develop increasing shortness of breath recently, no chest pains or tightness.  Had a stress echo showing decreased left ventricular systolic function at baseline without normal augmentation to hyperdynamic function.  A exercise treadmill test showed bradycardia at rest due to blocked PACs.  She was able to increase her heart rate into normal ranges with exercise.    She does have PACs/PVCs and dyslipidemia.  Did episode symptomatic bradycardia several weeks ago, Holter monitor showed very frequent PACs and PVCs.  She continues to be symptomatic and was seen by EP, felt that she was having symptomatic bradycardia, permanent pacemaker was recommended, she is feeling better now and, will follow-up after another Holter with EP.    She does have positive family history for congestive heart failure.  Hypercholesterolemia is treated with moderate dose statin therapy.  Lipid profile in  shows total cholesterol 188, HDL 57, , triglycerides 79.     Her father had AF and  of CHF.     She only smoked in high school.              Past Medical History:   Diagnosis Date    Premature atrial contractions     Seizure disorder        Review of patient's allergies indicates:  No Known Allergies      Current Outpatient Medications:     atorvastatin (LIPITOR) 20 MG tablet, TAKE 1 TABLET BY MOUTH EVERY DAY, Disp: 90 tablet, Rfl: 3    carBAMazepine (CARBATROL) 300 MG CM12, TAKE 1 CAPSULE BY MOUTH TWICE A DAY (Patient taking differently: Take 300 mg by mouth once daily.), Disp: 180 capsule, Rfl: 1    ergocalciferol (ERGOCALCIFEROL) 50,000 unit Cap, Take 1 capsule by mouth once a week., Disp: , Rfl:     famotidine (PEPCID AC ORAL), Take 1 tablet by mouth daily as needed (Heartburn)., Disp: , Rfl:     oxybutynin (DITROPAN-XL) 5 MG TR24, Take 5 mg by mouth once daily.,  Disp: , Rfl:     risedronate (ACTONEL) 35 MG tablet, Take 35 mg by mouth every 7 days., Disp: , Rfl:     magnesium oxide (MAG-OX) 400 mg (241.3 mg magnesium) tablet, Take 1 daily; if diarrhea occurs, decrease dose (Patient not taking: Reported on 3/1/2024), Disp: , Rfl: 0     Objective:   Review of Systems   Cardiovascular:  Negative for chest pain, claudication, cyanosis, dyspnea on exertion, irregular heartbeat, leg swelling, near-syncope, orthopnea, palpitations, paroxysmal nocturnal dyspnea and syncope.       Vitals:    03/01/24 1531   BP: (!) 160/61   Pulse: (!) 42         Physical Exam  Vitals reviewed.   Constitutional:       General: She is not in acute distress.     Appearance: She is well-developed.   HENT:      Head: Normocephalic and atraumatic.      Nose: Nose normal.   Eyes:      Conjunctiva/sclera: Conjunctivae normal.      Pupils: Pupils are equal, round, and reactive to light.   Neck:      Vascular: No JVD.   Cardiovascular:      Rate and Rhythm: Normal rate and regular rhythm.      Pulses: Intact distal pulses.      Heart sounds: Normal heart sounds. No murmur heard.     No friction rub. No gallop.   Pulmonary:      Effort: Pulmonary effort is normal. No respiratory distress.      Breath sounds: Normal breath sounds. No wheezing or rales.   Chest:      Chest wall: No tenderness.   Abdominal:      General: Bowel sounds are normal. There is no distension.      Palpations: Abdomen is soft.      Tenderness: There is no abdominal tenderness.   Musculoskeletal:         General: No tenderness or deformity. Normal range of motion.      Cervical back: Normal range of motion and neck supple.   Skin:     General: Skin is warm and dry.      Findings: No erythema or rash.   Neurological:      Mental Status: She is alert and oriented to person, place, and time.      Cranial Nerves: No cranial nerve deficit.      Motor: No abnormal muscle tone.      Coordination: Coordination normal.   Psychiatric:          Behavior: Behavior normal.         Thought Content: Thought content normal.         Judgment: Judgment normal.                       Assessment and Plan:     Abnormal stress test  -     CBC Auto Differential; Future; Expected date: 03/01/2024  -     Basic Metabolic Panel; Future; Expected date: 03/01/2024  -     Place in Outpatient; Standing  -     Vital signs; Standing  -     Start or verify patency of one 18 g, left arm preferred prior to procedure; Standing  -     Verify informed consent, place on front of chart; Standing  -     Void on call to Cath Lab; Standing  -     Clip and Prep Right Radial, Right Femoral; Standing  -     Diet NPO Except for: Medication; Standing  -     Case Request-Cath Lab: ANGIOGRAM, CORONARY ARTERY; Standing    Anginal equivalent  -     CBC Auto Differential; Future; Expected date: 03/01/2024  -     Basic Metabolic Panel; Future; Expected date: 03/01/2024  -     Place in Outpatient; Standing  -     Vital signs; Standing  -     Start or verify patency of one 18 g, left arm preferred prior to procedure; Standing  -     Verify informed consent, place on front of chart; Standing  -     Void on call to Cath Lab; Standing  -     Clip and Prep Right Radial, Right Femoral; Standing  -     Diet NPO Except for: Medication; Standing  -     Case Request-Cath Lab: ANGIOGRAM, CORONARY ARTERY; Standing    Premature atrial contractions    Pure hypercholesterolemia    Symptomatic bradycardia    Witnessed episode of apnea  -     Ambulatory referral/consult to Sleep Disorders; Future; Expected date: 03/08/2024    Other orders  -     Ambulate; Standing  -     sodium chloride 0.9% flush 10 mL  -     0.9%  NaCl infusion              No follow-ups on file.

## 2024-03-04 ENCOUNTER — LAB VISIT (OUTPATIENT)
Dept: LAB | Facility: HOSPITAL | Age: 69
End: 2024-03-04
Attending: INTERNAL MEDICINE
Payer: COMMERCIAL

## 2024-03-04 DIAGNOSIS — R94.39 ABNORMAL STRESS TEST: ICD-10-CM

## 2024-03-04 DIAGNOSIS — I20.89 ANGINAL EQUIVALENT: ICD-10-CM

## 2024-03-04 LAB
ANION GAP SERPL CALC-SCNC: 8 MMOL/L (ref 8–16)
BASOPHILS # BLD AUTO: 0.02 K/UL (ref 0–0.2)
BASOPHILS NFR BLD: 0.3 % (ref 0–1.9)
BUN SERPL-MCNC: 13 MG/DL (ref 8–23)
CALCIUM SERPL-MCNC: 9.4 MG/DL (ref 8.7–10.5)
CHLORIDE SERPL-SCNC: 108 MMOL/L (ref 95–110)
CO2 SERPL-SCNC: 24 MMOL/L (ref 23–29)
CREAT SERPL-MCNC: 0.7 MG/DL (ref 0.5–1.4)
DIFFERENTIAL METHOD BLD: ABNORMAL
EOSINOPHIL # BLD AUTO: 0.2 K/UL (ref 0–0.5)
EOSINOPHIL NFR BLD: 2.2 % (ref 0–8)
ERYTHROCYTE [DISTWIDTH] IN BLOOD BY AUTOMATED COUNT: 12.7 % (ref 11.5–14.5)
EST. GFR  (NO RACE VARIABLE): >60 ML/MIN/1.73 M^2
GLUCOSE SERPL-MCNC: 90 MG/DL (ref 70–110)
HCT VFR BLD AUTO: 39.2 % (ref 37–48.5)
HGB BLD-MCNC: 12.4 G/DL (ref 12–16)
IMM GRANULOCYTES # BLD AUTO: 0.02 K/UL (ref 0–0.04)
IMM GRANULOCYTES NFR BLD AUTO: 0.3 % (ref 0–0.5)
LYMPHOCYTES # BLD AUTO: 1.6 K/UL (ref 1–4.8)
LYMPHOCYTES NFR BLD: 24.4 % (ref 18–48)
MCH RBC QN AUTO: 31.9 PG (ref 27–31)
MCHC RBC AUTO-ENTMCNC: 31.6 G/DL (ref 32–36)
MCV RBC AUTO: 101 FL (ref 82–98)
MONOCYTES # BLD AUTO: 0.5 K/UL (ref 0.3–1)
MONOCYTES NFR BLD: 8.1 % (ref 4–15)
NEUTROPHILS # BLD AUTO: 4.3 K/UL (ref 1.8–7.7)
NEUTROPHILS NFR BLD: 64.7 % (ref 38–73)
NRBC BLD-RTO: 0 /100 WBC
PLATELET # BLD AUTO: 223 K/UL (ref 150–450)
PMV BLD AUTO: 11.6 FL (ref 9.2–12.9)
POTASSIUM SERPL-SCNC: 4.2 MMOL/L (ref 3.5–5.1)
RBC # BLD AUTO: 3.89 M/UL (ref 4–5.4)
SODIUM SERPL-SCNC: 140 MMOL/L (ref 136–145)
WBC # BLD AUTO: 6.68 K/UL (ref 3.9–12.7)

## 2024-03-04 PROCEDURE — 80048 BASIC METABOLIC PNL TOTAL CA: CPT | Performed by: INTERNAL MEDICINE

## 2024-03-04 PROCEDURE — 85025 COMPLETE CBC W/AUTO DIFF WBC: CPT | Performed by: INTERNAL MEDICINE

## 2024-03-04 PROCEDURE — 36415 COLL VENOUS BLD VENIPUNCTURE: CPT | Performed by: INTERNAL MEDICINE

## 2024-03-05 ENCOUNTER — PATIENT MESSAGE (OUTPATIENT)
Dept: CARDIOLOGY | Facility: HOSPITAL | Age: 69
End: 2024-03-05
Payer: COMMERCIAL

## 2024-03-08 ENCOUNTER — HOSPITAL ENCOUNTER (OUTPATIENT)
Facility: HOSPITAL | Age: 69
Discharge: HOME | End: 2024-03-08
Attending: INTERNAL MEDICINE | Admitting: INTERNAL MEDICINE
Payer: COMMERCIAL

## 2024-03-08 VITALS
TEMPERATURE: 98 F | RESPIRATION RATE: 17 BRPM | WEIGHT: 150 LBS | HEART RATE: 78 BPM | BODY MASS INDEX: 28.32 KG/M2 | DIASTOLIC BLOOD PRESSURE: 63 MMHG | OXYGEN SATURATION: 96 % | HEIGHT: 61 IN | SYSTOLIC BLOOD PRESSURE: 125 MMHG

## 2024-03-08 DIAGNOSIS — R94.39 ABNORMAL STRESS TEST: ICD-10-CM

## 2024-03-08 DIAGNOSIS — I20.89 ANGINAL EQUIVALENT: ICD-10-CM

## 2024-03-08 PROCEDURE — C1887 CATHETER, GUIDING: HCPCS | Performed by: INTERNAL MEDICINE

## 2024-03-08 PROCEDURE — 63600175 PHARM REV CODE 636 W HCPCS: Performed by: INTERNAL MEDICINE

## 2024-03-08 PROCEDURE — C1769 GUIDE WIRE: HCPCS | Performed by: INTERNAL MEDICINE

## 2024-03-08 PROCEDURE — 99152 MOD SED SAME PHYS/QHP 5/>YRS: CPT | Mod: ,,, | Performed by: INTERNAL MEDICINE

## 2024-03-08 PROCEDURE — 25000003 PHARM REV CODE 250: Performed by: INTERNAL MEDICINE

## 2024-03-08 PROCEDURE — 93454 CORONARY ARTERY ANGIO S&I: CPT | Mod: 26,,, | Performed by: INTERNAL MEDICINE

## 2024-03-08 PROCEDURE — 25500020 PHARM REV CODE 255: Performed by: INTERNAL MEDICINE

## 2024-03-08 PROCEDURE — C1894 INTRO/SHEATH, NON-LASER: HCPCS | Performed by: INTERNAL MEDICINE

## 2024-03-08 PROCEDURE — 93454 CORONARY ARTERY ANGIO S&I: CPT | Performed by: INTERNAL MEDICINE

## 2024-03-08 PROCEDURE — 99152 MOD SED SAME PHYS/QHP 5/>YRS: CPT | Performed by: INTERNAL MEDICINE

## 2024-03-08 RX ORDER — SODIUM CHLORIDE 9 MG/ML
INJECTION, SOLUTION INTRAVENOUS CONTINUOUS
Status: ACTIVE | OUTPATIENT
Start: 2024-03-08 | End: 2024-03-08

## 2024-03-08 RX ORDER — LIDOCAINE HYDROCHLORIDE 20 MG/ML
INJECTION, SOLUTION INFILTRATION; PERINEURAL
Status: DISCONTINUED | OUTPATIENT
Start: 2024-03-08 | End: 2024-03-08 | Stop reason: HOSPADM

## 2024-03-08 RX ORDER — ASPIRIN 325 MG
325 TABLET ORAL ONCE
Status: COMPLETED | OUTPATIENT
Start: 2024-03-08 | End: 2024-03-08

## 2024-03-08 RX ORDER — FENTANYL CITRATE 50 UG/ML
INJECTION, SOLUTION INTRAMUSCULAR; INTRAVENOUS
Status: DISCONTINUED | OUTPATIENT
Start: 2024-03-08 | End: 2024-03-08 | Stop reason: HOSPADM

## 2024-03-08 RX ORDER — HEPARIN SODIUM 1000 [USP'U]/ML
INJECTION, SOLUTION INTRAVENOUS; SUBCUTANEOUS
Status: DISCONTINUED | OUTPATIENT
Start: 2024-03-08 | End: 2024-03-08 | Stop reason: HOSPADM

## 2024-03-08 RX ORDER — HEPARIN SOD,PORCINE/0.9 % NACL 1000/500ML
INTRAVENOUS SOLUTION INTRAVENOUS
Status: DISCONTINUED | OUTPATIENT
Start: 2024-03-08 | End: 2024-03-08 | Stop reason: HOSPADM

## 2024-03-08 RX ORDER — MIDAZOLAM HYDROCHLORIDE 1 MG/ML
INJECTION, SOLUTION INTRAMUSCULAR; INTRAVENOUS
Status: DISCONTINUED | OUTPATIENT
Start: 2024-03-08 | End: 2024-03-08 | Stop reason: HOSPADM

## 2024-03-08 RX ORDER — SODIUM CHLORIDE 0.9 % (FLUSH) 0.9 %
10 SYRINGE (ML) INJECTION
Status: DISCONTINUED | OUTPATIENT
Start: 2024-03-08 | End: 2024-03-08 | Stop reason: HOSPADM

## 2024-03-08 RX ADMIN — ASPIRIN 325 MG ORAL TABLET 325 MG: 325 PILL ORAL at 09:03

## 2024-03-08 RX ADMIN — SODIUM CHLORIDE: 9 INJECTION, SOLUTION INTRAVENOUS at 07:03

## 2024-03-08 NOTE — PLAN OF CARE
Received report from Clarence cath RN. Patient s/p [facility] with no interventions, AAOx3. VSS, no c/o pain or discomfort at this time, resp even and unlabored on RA. Bigeminy PVCs before/during cath. Inflated vasc band to right wrist radial artery is CDI. No active bleeding. No hematoma noted. Palpable pulses. Post procedure protocol reviewed with patient and patient's family. Understanding verbalized. Family member called to bedside. Nurse call bell within reach.

## 2024-03-08 NOTE — DISCHARGE SUMMARY
Discharge Summary  Interventional Cardiology      Admit Date: 3/8/2024    Discharge Date:  3/8/2024    Attending Physician: Leopoldo Comer Jr.,*    Discharge Physician: Leopoldo Comer Jr.,*    Principal Diagnoses: <principal problem not specified>  Indication for Admission: ANGIOGRAM, CORONARY ARTERY (N/A)    Discharged Condition: Good    Hospital Course:   Patient presented for outpatient coronary angiogram which went without complication. Coronary angiogram was nonobstructive. See full cath report in Epic for details. Hemostasis of patient's R Radial access site was achieved with VascBand. Patient was monitored per post-cath protocol, and her R radial access site was c/d/i with no hematoma. Patient was able to ambulate without difficulty. She was feeling well and anticipating discharge home today.     Outpatient Plan:  - There were no medication changes    Diet: Cardiac diet    Activity: Ad sangeeta, wound care instructions provided    Disposition: Home or Self Care        Discharge Medications:      Medication List        CHANGE how you take these medications      carBAMazepine 300 MG Cm12  Commonly known as: CARBATROL  TAKE 1 CAPSULE BY MOUTH TWICE A DAY  What changed: when to take this            CONTINUE taking these medications      atorvastatin 20 MG tablet  Commonly known as: LIPITOR  TAKE 1 TABLET BY MOUTH EVERY DAY     ergocalciferol 50,000 unit Cap  Commonly known as: ERGOCALCIFEROL     magnesium oxide 400 mg (241.3 mg magnesium) tablet  Commonly known as: MAG-OX  Take 1 daily; if diarrhea occurs, decrease dose     oxybutynin 5 MG Tr24  Commonly known as: DITROPAN-XL     PEPCID AC ORAL     risedronate 35 MG tablet  Commonly known as: MILAD Sullivan  Cardiology Fellow PGY-6

## 2024-03-08 NOTE — NURSING
@1300  Rigth wrist band deflated per protocol. @1315 Removed band and cleansed area. Applied gauze and tegaderm to right wrist. Good pulse and color to area. Pt ambulates and voids without distress. Right wrist remains CDI. No bleeding or hematoma noted. Patient discharged per MD orders. Instructions given on medications, wound care, activity, signs of infection, when to call MD, and follow up appointments. Pt verbalized understanding. PIV removed. Patient and family used wc off unit to private vehicle.

## 2024-03-08 NOTE — INTERVAL H&P NOTE
The patient has been examined and the H&P has been reviewed:    I concur with the findings and no changes have occurred since H&P was written.    Procedure risks, benefits and alternative options discussed and understood by patient/family.    Vishnu Sullivan   Department of Cardiovascular Disease, PGY-6   [facility]

## 2024-03-08 NOTE — Clinical Note
Post-Anesthesia Evaluation and Assessment    Patient: Jaycee Bowman MRN: [id number]  SSN: [id number]    YOB: 2012  Age: 3 y.o. Sex: male       Cardiovascular Function/Vital Signs  Visit Vitals    Pulse 94    Temp 35.9 °C (96.6 °F)    Resp 20    Ht (!) 108.2 cm    Wt 15 kg    SpO2 100%    BMI 12.81 kg/m2       Patient is status post general anesthesia for Procedure(s): MOUTH FULL DENTAL REHABILITATION with EXTRACTIONS    X 4      SSC X  4    resins X 2. Nausea/Vomiting: None    Postoperative hydration reviewed and adequate. Pain:  Pain Scale 1: Visual (08/14/18 0955)  Pain Intensity 1: 0 (08/14/18 0955)   Managed    Neurological Status:   Neuro (WDL): Within Defined Limits (08/14/18 0939)  Neuro  LUE Motor Response: Purposeful (08/14/18 0939)  LLE Motor Response: Purposeful (08/14/18 0939)  RUE Motor Response: Purposeful (08/14/18 0939)  RLE Motor Response: Purposeful (08/14/18 0939)   At baseline    Mental Status and Level of Consciousness: Arousable    Pulmonary Status:   O2 Device: Blow by oxygen (08/14/18 0955)   Adequate oxygenation and airway patent    Complications related to anesthesia: None    Post-anesthesia assessment completed.  No concerns    Signed By: Katerine Pang MD     August 14, 2018 The catheter was inserted into the aorta.

## 2024-03-08 NOTE — BRIEF OP NOTE
Brief Operative Note:    : Leopoldo Comer Jr., MD     Referring Physician: Leopoldo Comer Jr.     All Operators: Surgeon(s):  Leopoldo Comer Jr., Vishnu Garcia MD Maitas, Oscar, MD     Preoperative Diagnosis: Abnormal stress test [R94.39]  Anginal equivalent [I20.89]     Postop Diagnosis: Abnormal stress test [R94.39]  Anginal equivalent [I20.89]    Treatments/Procedures: Procedure(s) (LRB):  ANGIOGRAM, CORONARY ARTERY (N/A)    Access: Right radial artery    Findings:    Non-obstructive CAD noted     See catheterization report for full details.    Intervention:     See catheterization report for full details.    Closure device:  VascBand         Plan:  - Post cath protocol   - IVF @ 150 cc/hr x 2 hours  - Bed rest x 1 hours   - Continue high intensity statin therapy (LDL goal < 70)  - Risk factor reduction (BP <130/80 mmHg, glycemic control, etc)  - Follow up with outpatient cardiologist    Estimated Blood loss: 20 cc    Specimens removed: No    Vishnu Sullivan MD  Interventional Cardiology PGY-6

## 2024-03-11 ENCOUNTER — TELEPHONE (OUTPATIENT)
Dept: ELECTROPHYSIOLOGY | Facility: CLINIC | Age: 69
End: 2024-03-11
Payer: COMMERCIAL

## 2024-03-11 ENCOUNTER — PATIENT MESSAGE (OUTPATIENT)
Dept: ELECTROPHYSIOLOGY | Facility: CLINIC | Age: 69
End: 2024-03-11
Payer: COMMERCIAL

## 2024-03-21 ENCOUNTER — OFFICE VISIT (OUTPATIENT)
Dept: CARDIOLOGY | Facility: CLINIC | Age: 69
End: 2024-03-21
Payer: COMMERCIAL

## 2024-03-21 VITALS
WEIGHT: 151.44 LBS | HEART RATE: 66 BPM | DIASTOLIC BLOOD PRESSURE: 84 MMHG | BODY MASS INDEX: 28.62 KG/M2 | SYSTOLIC BLOOD PRESSURE: 122 MMHG

## 2024-03-21 DIAGNOSIS — I49.1 PREMATURE ATRIAL CONTRACTIONS: Primary | Chronic | ICD-10-CM

## 2024-03-21 DIAGNOSIS — R00.1 SYMPTOMATIC BRADYCARDIA: Chronic | ICD-10-CM

## 2024-03-21 PROCEDURE — 99999 PR PBB SHADOW E&M-EST. PATIENT-LVL III: CPT | Mod: PBBFAC,,, | Performed by: INTERNAL MEDICINE

## 2024-03-21 PROCEDURE — 3074F SYST BP LT 130 MM HG: CPT | Mod: CPTII,S$GLB,, | Performed by: INTERNAL MEDICINE

## 2024-03-21 PROCEDURE — 1126F AMNT PAIN NOTED NONE PRSNT: CPT | Mod: CPTII,S$GLB,, | Performed by: INTERNAL MEDICINE

## 2024-03-21 PROCEDURE — 3288F FALL RISK ASSESSMENT DOCD: CPT | Mod: CPTII,S$GLB,, | Performed by: INTERNAL MEDICINE

## 2024-03-21 PROCEDURE — 3008F BODY MASS INDEX DOCD: CPT | Mod: CPTII,S$GLB,, | Performed by: INTERNAL MEDICINE

## 2024-03-21 PROCEDURE — 99213 OFFICE O/P EST LOW 20 MIN: CPT | Mod: S$GLB,,, | Performed by: INTERNAL MEDICINE

## 2024-03-21 PROCEDURE — 3079F DIAST BP 80-89 MM HG: CPT | Mod: CPTII,S$GLB,, | Performed by: INTERNAL MEDICINE

## 2024-03-21 PROCEDURE — 1101F PT FALLS ASSESS-DOCD LE1/YR: CPT | Mod: CPTII,S$GLB,, | Performed by: INTERNAL MEDICINE

## 2024-03-21 PROCEDURE — 1159F MED LIST DOCD IN RCRD: CPT | Mod: CPTII,S$GLB,, | Performed by: INTERNAL MEDICINE

## 2024-03-21 NOTE — PROGRESS NOTES
Subjective:   2024     Patient ID:  Teresa Dennis is a 68 y.o. female who presents for evaulation of Post-op Evaluation          Prior notes reviewed.  Patient comes in for follow-up, no problems after cardiac catheterization, no chest pains or tightness, no PND or orthopnea.  Blood pressure appears to be well controlled, not on meds.    She does have frequent PACs on monitoring, will see EP again.    Echocardiography showed decreased systolic function, patient currently asymptomatic, will repeat in 6 months.      Prior note reviewed:  Patient did develop increasing shortness of breath recently, no chest pains or tightness.  Had a stress echo showing decreased left ventricular systolic function at baseline without normal augmentation to hyperdynamic function.  A exercise treadmill test showed bradycardia at rest due to blocked PACs.  She was able to increase her heart rate into normal ranges with exercise.    She does have PACs/PVCs and dyslipidemia.  Did episode symptomatic bradycardia several weeks ago, Holter monitor showed very frequent PACs and PVCs.  She continues to be symptomatic and was seen by EP, felt that she was having symptomatic bradycardia, permanent pacemaker was recommended, she is feeling better now and, will follow-up after another Holter with EP.    She does have positive family history for congestive heart failure.  Hypercholesterolemia is treated with moderate dose statin therapy.  Lipid profile in  shows total cholesterol 188, HDL 57, , triglycerides 79.     Her father had AF and  of CHF.     She only smoked in high school.    Subsequent stress echo:     ·  Left Ventricle: Mild global hypokinesis present. There is mildly reduced systolic function with a visually estimated ejection fraction of 45 - 50%.  ·  Right Ventricle: Normal right ventricular cavity size. Wall thickness is normal. Right ventricle wall motion  is normal. Systolic function is normal.  ·  Tricuspid  Valve: There is mild regurgitation.  ·  Pulmonary Artery: There is mild pulmonary hypertension. The estimated pulmonary artery systolic pressure is 43 mmHg.  ·  IVC/SVC: Normal venous pressure at 3 mmHg.  ·  Stress Protocol: The patient exercised for 11 minutes 30 seconds on a medium ramp protocol, corresponding to a functional capacity of 7 METS, achieving a peak heart rate of 134 bpm, which is 91 % of the age predicted maximum heart rate. Their exercise capacity was above average. The patient reported no symptoms during the stress test. The test was stopped because the end of the protocol was reached.  ·  Baseline ECG: The Baseline ECG reveals sinus rhythm. The axis is normal. The ST segments are normal.  ·  Stress ECG: There are no ST segment deviation identified during the protocol. During stress, frequent PACs are noted. During stress, frequent PVCs are noted. There is normal blood pressure response with stress.  ·  ECG Conclusion: The ECG portion of the study is negative for ischemia.  ·  Post-stress Echo: The left ventricle systolic function is normal.  ·  Post-stress Impression: The study is negative with no echocardiographic evidence of stress induced ischemia.       Abnormal stress echocardiogram, left ventricular systolic function at rest is mildly decreased.  This does appear to normalized with exercise, exercise capacity is above average.     Frequent PACs and PVCs are present throughout the study.    Cardiac catheterization:  ·  The estimated blood loss was <50 mL.  ·  The coronary arteries were normal..     Patient with abnormal stress echocardiogram, very frequent PACs and PVCs.  Cardiac catheterization today though shows normal coronary arteries.  She should follow-up with EP, does have fairly severe bradycardia.  This may be related to blocked PACs as noted on her stress test.                    Past Medical History:   Diagnosis Date    Premature atrial contractions     PVC (premature ventricular  contraction)     Seizure disorder        Review of patient's allergies indicates:  No Known Allergies      Current Outpatient Medications:     atorvastatin (LIPITOR) 20 MG tablet, TAKE 1 TABLET BY MOUTH EVERY DAY, Disp: 90 tablet, Rfl: 3    carBAMazepine (CARBATROL) 300 MG CM12, TAKE 1 CAPSULE BY MOUTH TWICE A DAY (Patient taking differently: Take 300 mg by mouth once daily.), Disp: 180 capsule, Rfl: 1    ergocalciferol (ERGOCALCIFEROL) 50,000 unit Cap, Take 1 capsule by mouth once a week., Disp: , Rfl:     famotidine (PEPCID AC ORAL), Take 1 tablet by mouth daily as needed (Heartburn)., Disp: , Rfl:     magnesium oxide (MAG-OX) 400 mg (241.3 mg magnesium) tablet, Take 1 daily; if diarrhea occurs, decrease dose, Disp: , Rfl: 0    oxybutynin (DITROPAN-XL) 5 MG TR24, Take 5 mg by mouth once daily., Disp: , Rfl:     risedronate (ACTONEL) 35 MG tablet, Take 35 mg by mouth every 7 days., Disp: , Rfl:      Objective:   Review of Systems   Cardiovascular:  Negative for chest pain, claudication, cyanosis, dyspnea on exertion, irregular heartbeat, leg swelling, near-syncope, orthopnea, palpitations, paroxysmal nocturnal dyspnea and syncope.       Vitals:    03/21/24 0836   BP: 122/84   Pulse: 66         Physical Exam  Vitals reviewed.   Constitutional:       General: She is not in acute distress.     Appearance: She is well-developed.   HENT:      Head: Normocephalic and atraumatic.      Nose: Nose normal.   Eyes:      Conjunctiva/sclera: Conjunctivae normal.      Pupils: Pupils are equal, round, and reactive to light.   Neck:      Vascular: No JVD.   Cardiovascular:      Rate and Rhythm: Normal rate. Rhythm irregular.      Pulses: Intact distal pulses.      Heart sounds: Normal heart sounds. No murmur heard.     No friction rub. No gallop.   Pulmonary:      Effort: Pulmonary effort is normal. No respiratory distress.      Breath sounds: Normal breath sounds. No wheezing or rales.   Chest:      Chest wall: No tenderness.    Abdominal:      General: Bowel sounds are normal. There is no distension.      Palpations: Abdomen is soft.      Tenderness: There is no abdominal tenderness.   Musculoskeletal:         General: No tenderness or deformity. Normal range of motion.      Cervical back: Normal range of motion and neck supple.   Skin:     General: Skin is warm and dry.      Findings: No erythema or rash.   Neurological:      Mental Status: She is alert and oriented to person, place, and time.      Cranial Nerves: No cranial nerve deficit.      Motor: No abnormal muscle tone.      Coordination: Coordination normal.   Psychiatric:         Behavior: Behavior normal.         Thought Content: Thought content normal.         Judgment: Judgment normal.                       Assessment and Plan:     Premature atrial contractions  -     Echo; Future; Expected date: 09/21/2024    Symptomatic bradycardia  -     Echo; Future; Expected date: 09/21/2024            Patient with normal coronary arteries.  Had mild left ventricular systolic dysfunction on echocardiography.  Currently asymptomatic.  Will plan for repeat echo in 6 months with return to clinic, in the interim, she will follow-up with EP.    No follow-ups on file.

## 2024-03-26 NOTE — PROGRESS NOTES
"Subjective:     HPI    I had the pleasure of seeing Teresa Dennis in follow-up for her history of ectopy. Last seen in 9/2023. She is a 68F with seizure disorder who was well until 8/2023 when she began to note worsening fatigue. She was also receiving intermittent alarms on her AppleWatch that her HR was in the 30-40s bpm range. In 9/2023 she presented to the ED, was found to be in atrial bigeminy, and started on .Echo showed EF 55-60%, mod LAE.     Notably, pt was at work as a psych nurse. Two weeks ago she was feeling tired at work. Another nurse took vitals, which were notable for HR in the 30s bpm range.    A 48 hr Holter performed in 8/2023 showed sinus rhythm average HR 68 bpm (range  bpm), PAC burden 12.4%, PVC burden 2.2%, no sustained arrhythmias. Multiple HRs in the 30s bpm range occurred during the daytime.    Pt states she started [employer] in 8/2023 and has lost 10-14 lbs since then. Pt had COVID in 7/2023.    At 9/2023 visit HR 47 bpm--pt felt fine. My impression was that symptoms were 2/2 SSS as opposed to ectopy. Discussed pacemaker, but she deferred.    A 24 hr Holter in 11/2023 showed sinus rhythm average HR 82 bpm (range  bpm), PAC burden 27.3%, 5 runs of nonsustained AT longest 10 beats, PVC burden 0.4%, no sustained arrhythmias.    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 This was prompted by a stress echo notable for multiple periods of frequent ectopy and blocked PACs.    Today in the office pt states she has been noting intermittent HENLEY, particularly with climbing stairs. Also intermittently feels "not focused".     My interpretation of today's ECG is sinus rhythm 84 bpm with PACs occurring in a bigeminy pattern. Pulse check indicates HR in 40s bpm range (nonperfusing PACs).    Review of Systems   Constitutional: Positive for malaise/fatigue. Negative for decreased appetite, weight gain and weight loss.   HENT:  Negative for sore throat.    Eyes:  Negative for blurred " vision.   Cardiovascular:  Negative for chest pain, dyspnea on exertion, irregular heartbeat, leg swelling, near-syncope, orthopnea, palpitations, paroxysmal nocturnal dyspnea and syncope.   Respiratory:  Negative for shortness of breath.    Skin:  Negative for rash.   Musculoskeletal:  Negative for arthritis.   Gastrointestinal:  Negative for abdominal pain.   Neurological:  Negative for focal weakness.   Psychiatric/Behavioral:  Negative for altered mental status.        Objective:   Physical Exam  Constitutional:       General: She is not in acute distress.     Appearance: She is well-developed.   HENT:      Head: Normocephalic and atraumatic.   Eyes:      General: No scleral icterus.     Conjunctiva/sclera: Conjunctivae normal.      Pupils: Pupils are equal, round, and reactive to light.   Neck:      Thyroid: No thyromegaly.      Vascular: No JVD.   Cardiovascular:      Rate and Rhythm: Normal rate and regular rhythm.      Pulses: Intact distal pulses.      Heart sounds: Normal heart sounds. No murmur heard.     No friction rub. No gallop.   Pulmonary:      Effort: Pulmonary effort is normal. No respiratory distress.      Breath sounds: Normal breath sounds.   Abdominal:      General: Bowel sounds are normal. There is no distension.      Palpations: Abdomen is soft.   Musculoskeletal:      Cervical back: Normal range of motion and neck supple.   Skin:     General: Skin is warm and dry.   Neurological:      Mental Status: She is alert and oriented to person, place, and time.   Psychiatric:         Behavior: Behavior normal.         Assessment:      1. Premature atrial contractions    2. Symptomatic bradycardia    3. Pure hypercholesterolemia        Plan:     In summary, Teresa Dennis is a 68F with a history of symptomatic bradycardia, at this point most likely 2/2 frequent blocked PACs. PAC burden currently stands at 27%. Discussed risks, benefits, indications, alternatives to PAC ablation. Plan is to  proceed.    CARTO.    Thank you for allowing me to participate in the care of this patient. Please do not hesitate to call me with any questions or concerns.  .

## 2024-04-08 ENCOUNTER — HOSPITAL ENCOUNTER (OUTPATIENT)
Dept: CARDIOLOGY | Facility: CLINIC | Age: 69
Discharge: HOME | End: 2024-04-08
Payer: COMMERCIAL

## 2024-04-08 ENCOUNTER — OFFICE VISIT (OUTPATIENT)
Dept: ELECTROPHYSIOLOGY | Facility: CLINIC | Age: 69
End: 2024-04-08
Payer: COMMERCIAL

## 2024-04-08 ENCOUNTER — OFFICE VISIT (OUTPATIENT)
Dept: OTOLARYNGOLOGY | Facility: CLINIC | Age: 69
End: 2024-04-08
Payer: COMMERCIAL

## 2024-04-08 VITALS
BODY MASS INDEX: 28.47 KG/M2 | HEIGHT: 61 IN | WEIGHT: 150.81 LBS | SYSTOLIC BLOOD PRESSURE: 148 MMHG | DIASTOLIC BLOOD PRESSURE: 65 MMHG | HEART RATE: 40 BPM

## 2024-04-08 VITALS
DIASTOLIC BLOOD PRESSURE: 80 MMHG | BODY MASS INDEX: 28.62 KG/M2 | SYSTOLIC BLOOD PRESSURE: 146 MMHG | HEART RATE: 87 BPM | WEIGHT: 151.44 LBS

## 2024-04-08 DIAGNOSIS — E78.00 PURE HYPERCHOLESTEROLEMIA: Chronic | ICD-10-CM

## 2024-04-08 DIAGNOSIS — G47.30 SLEEP-DISORDERED BREATHING: ICD-10-CM

## 2024-04-08 DIAGNOSIS — R00.1 SYMPTOMATIC BRADYCARDIA: Chronic | ICD-10-CM

## 2024-04-08 DIAGNOSIS — I49.8 OTHER SPECIFIED CARDIAC ARRHYTHMIAS: ICD-10-CM

## 2024-04-08 DIAGNOSIS — H61.22 IMPACTED CERUMEN OF LEFT EAR: Primary | ICD-10-CM

## 2024-04-08 DIAGNOSIS — I49.1 PREMATURE ATRIAL CONTRACTIONS: Primary | Chronic | ICD-10-CM

## 2024-04-08 LAB
OHS QRS DURATION: 86 MS
OHS QTC CALCULATION: 505 MS

## 2024-04-08 PROCEDURE — 3077F SYST BP >= 140 MM HG: CPT | Mod: CPTII,S$GLB,, | Performed by: INTERNAL MEDICINE

## 2024-04-08 PROCEDURE — 1101F PT FALLS ASSESS-DOCD LE1/YR: CPT | Mod: CPTII,S$GLB,, | Performed by: INTERNAL MEDICINE

## 2024-04-08 PROCEDURE — 93010 ELECTROCARDIOGRAM REPORT: CPT | Mod: S$GLB,,, | Performed by: INTERNAL MEDICINE

## 2024-04-08 PROCEDURE — 3008F BODY MASS INDEX DOCD: CPT | Mod: CPTII,S$GLB,, | Performed by: INTERNAL MEDICINE

## 2024-04-08 PROCEDURE — 99999 PR PBB SHADOW E&M-EST. PATIENT-LVL III: CPT | Mod: PBBFAC,,, | Performed by: INTERNAL MEDICINE

## 2024-04-08 PROCEDURE — 3078F DIAST BP <80 MM HG: CPT | Mod: CPTII,S$GLB,, | Performed by: INTERNAL MEDICINE

## 2024-04-08 PROCEDURE — 3288F FALL RISK ASSESSMENT DOCD: CPT | Mod: CPTII,S$GLB,, | Performed by: INTERNAL MEDICINE

## 2024-04-08 PROCEDURE — 99499 UNLISTED E&M SERVICE: CPT | Mod: S$GLB,,, | Performed by: PHYSICIAN ASSISTANT

## 2024-04-08 PROCEDURE — 99215 OFFICE O/P EST HI 40 MIN: CPT | Mod: S$GLB,,, | Performed by: INTERNAL MEDICINE

## 2024-04-08 PROCEDURE — 69210 REMOVE IMPACTED EAR WAX UNI: CPT | Mod: S$GLB,,, | Performed by: PHYSICIAN ASSISTANT

## 2024-04-08 PROCEDURE — 1159F MED LIST DOCD IN RCRD: CPT | Mod: CPTII,S$GLB,, | Performed by: INTERNAL MEDICINE

## 2024-04-08 PROCEDURE — 93005 ELECTROCARDIOGRAM TRACING: CPT | Mod: S$GLB,,, | Performed by: INTERNAL MEDICINE

## 2024-04-08 PROCEDURE — 99999 PR PBB SHADOW E&M-EST. PATIENT-LVL III: CPT | Mod: PBBFAC,,, | Performed by: PHYSICIAN ASSISTANT

## 2024-04-08 PROCEDURE — 1126F AMNT PAIN NOTED NONE PRSNT: CPT | Mod: CPTII,S$GLB,, | Performed by: INTERNAL MEDICINE

## 2024-04-08 RX ORDER — OMEPRAZOLE 20 MG/1
1 TABLET, DELAYED RELEASE ORAL DAILY
COMMUNITY

## 2024-04-08 NOTE — PROCEDURES
Ear Cerumen Removal    Date/Time: 4/8/2024 10:00 AM    Performed by: Everardo Zamora PA-C  Authorized by: Everardo Zamora PA-C      Local anesthetic:  None  Location details:  Left ear  Procedure type: curette    Cerumen  Removal Results:  Cerumen completely removed  Patient tolerance:  Patient tolerated the procedure well with no immediate complications

## 2024-04-10 ENCOUNTER — TELEPHONE (OUTPATIENT)
Dept: SLEEP MEDICINE | Facility: OTHER | Age: 69
End: 2024-04-10
Payer: COMMERCIAL

## 2024-04-18 ENCOUNTER — HOSPITAL ENCOUNTER (OUTPATIENT)
Dept: SLEEP MEDICINE | Facility: HOSPITAL | Age: 69
Discharge: HOME | End: 2024-04-18
Attending: PHYSICIAN ASSISTANT
Payer: COMMERCIAL

## 2024-04-18 DIAGNOSIS — G47.30 SLEEP-DISORDERED BREATHING: ICD-10-CM

## 2024-04-18 DIAGNOSIS — G47.33 OSA (OBSTRUCTIVE SLEEP APNEA): Primary | ICD-10-CM

## 2024-04-29 PROBLEM — G47.30 SLEEP-DISORDERED BREATHING: Status: ACTIVE | Noted: 2024-04-29

## 2024-04-29 PROBLEM — G47.33 OSA (OBSTRUCTIVE SLEEP APNEA): Status: ACTIVE | Noted: 2024-04-29

## 2024-04-29 PROCEDURE — 95806 SLEEP STUDY UNATT&RESP EFFT: CPT | Mod: 26,,, | Performed by: PSYCHIATRY & NEUROLOGY

## 2024-04-29 NOTE — PROCEDURES
"Hi Dr. Soto,     You have ordered sleep LAB services to perform the sleep study for Teresa Dennis. The sleep study that you ordered is complete.     Please find Sleep Study result in  the "Media tab" of Chart Review; you can look  for the report in the  Media by the document type "Sleep Study Documents".       As the ordering provider, you are responsible for reviewing the results and implementing a treatment plan with your patient.      If you need a Sleep Medicine provider to explain the sleep study findings and arrange treatment for the patient, please refer patient for consultation to our Sleep Clinic via [facility] with Ambulatory Consult Sleep.The wait time to be seen in the sleep clinic is currently several months unfortunately, we are working on recruiting more providers.     To do that please place an order for an  "Ambulatory Consult Sleep" - it will go to our clinic work queue for our Medical Assistant to contact the patient for an appointment.     For any questions, please contact our clinic staff at [phone] to talk to clinical staff        Yvonne Berkowitz MD    "

## 2024-05-03 DIAGNOSIS — I49.1 PREMATURE ATRIAL CONTRACTIONS: Primary | Chronic | ICD-10-CM

## 2024-05-29 ENCOUNTER — PATIENT MESSAGE (OUTPATIENT)
Dept: ELECTROPHYSIOLOGY | Facility: CLINIC | Age: 69
End: 2024-05-29
Payer: COMMERCIAL

## 2024-06-05 ENCOUNTER — PATIENT MESSAGE (OUTPATIENT)
Dept: ELECTROPHYSIOLOGY | Facility: CLINIC | Age: 69
End: 2024-06-05
Payer: COMMERCIAL

## 2024-06-25 ENCOUNTER — TELEPHONE (OUTPATIENT)
Dept: PULMONOLOGY | Facility: CLINIC | Age: 69
End: 2024-06-25
Payer: COMMERCIAL

## 2024-06-25 NOTE — TELEPHONE ENCOUNTER
----- Message from Man Alcantar sent at 6/25/2024  4:08 PM CDT -----  Consult/Advisory  Teresa   Name Of Caller:      Contact Preference:[phone]    Nature of call: Ptn called regarding her results she will like to know have the dr received them before she come in please call to assist

## 2024-06-25 NOTE — TELEPHONE ENCOUNTER
I called Mrs Dennis and left a message on her voicemail that the sleep study was ordered by TIERA Zamora  in [facility] and she would need to call him for results.When she sees Dr Rosario on 7-9-24 he will explain the results to her and answer all questions she has.. Abiola Castro

## 2024-06-27 ENCOUNTER — TELEPHONE (OUTPATIENT)
Dept: PULMONOLOGY | Facility: CLINIC | Age: 69
End: 2024-06-27
Payer: COMMERCIAL

## 2024-06-27 NOTE — TELEPHONE ENCOUNTER
I called Mrs Dennis and left a voicemail message that she has 2 sleep clinic appointments, one with Dr Rosario on 7-9-23 and 7-31-24 with Dr Berkowitz at [facility] and which one does she want to keep? I asked her to call or message me and that Dr Rosario would be happy to see her and explain her sleep test to her. Abiola Castro

## 2024-06-27 NOTE — TELEPHONE ENCOUNTER
----- Message from Sadaf Cronin sent at 6/27/2024  4:04 PM CDT -----  Regarding: Pt advice  Contact: [phone]  Good afternoon the pt is calling to speak with provider or nurse regarding if the results were received from her sleep apnea test, so the pt can receive her c-pap machine. Please call and discuss with patient .

## 2024-07-09 ENCOUNTER — OFFICE VISIT (OUTPATIENT)
Dept: SLEEP MEDICINE | Facility: CLINIC | Age: 69
End: 2024-07-09
Payer: COMMERCIAL

## 2024-07-09 VITALS
DIASTOLIC BLOOD PRESSURE: 70 MMHG | BODY MASS INDEX: 28.56 KG/M2 | OXYGEN SATURATION: 97 % | SYSTOLIC BLOOD PRESSURE: 122 MMHG | HEIGHT: 61 IN | HEART RATE: 46 BPM | WEIGHT: 151.25 LBS

## 2024-07-09 DIAGNOSIS — G47.19 EXCESSIVE DAYTIME SLEEPINESS: ICD-10-CM

## 2024-07-09 DIAGNOSIS — G47.33 OSA (OBSTRUCTIVE SLEEP APNEA): Primary | ICD-10-CM

## 2024-07-09 PROCEDURE — 1160F RVW MEDS BY RX/DR IN RCRD: CPT | Mod: CPTII,S$GLB,, | Performed by: INTERNAL MEDICINE

## 2024-07-09 PROCEDURE — 3078F DIAST BP <80 MM HG: CPT | Mod: CPTII,S$GLB,, | Performed by: INTERNAL MEDICINE

## 2024-07-09 PROCEDURE — 99204 OFFICE O/P NEW MOD 45 MIN: CPT | Mod: S$GLB,,, | Performed by: INTERNAL MEDICINE

## 2024-07-09 PROCEDURE — 1159F MED LIST DOCD IN RCRD: CPT | Mod: CPTII,S$GLB,, | Performed by: INTERNAL MEDICINE

## 2024-07-09 PROCEDURE — 99999 PR PBB SHADOW E&M-EST. PATIENT-LVL III: CPT | Mod: PBBFAC,,, | Performed by: INTERNAL MEDICINE

## 2024-07-09 PROCEDURE — 1101F PT FALLS ASSESS-DOCD LE1/YR: CPT | Mod: CPTII,S$GLB,, | Performed by: INTERNAL MEDICINE

## 2024-07-09 PROCEDURE — 3074F SYST BP LT 130 MM HG: CPT | Mod: CPTII,S$GLB,, | Performed by: INTERNAL MEDICINE

## 2024-07-09 PROCEDURE — 3008F BODY MASS INDEX DOCD: CPT | Mod: CPTII,S$GLB,, | Performed by: INTERNAL MEDICINE

## 2024-07-09 PROCEDURE — 3288F FALL RISK ASSESSMENT DOCD: CPT | Mod: CPTII,S$GLB,, | Performed by: INTERNAL MEDICINE

## 2024-07-09 NOTE — PROGRESS NOTES
Subjective:   Patient ID: Teresa Dennis is a 69 y.o. female    Chief Complaint:   Chief Complaint   Patient presents with    Sleep Apnea       [facility]    HPI  Teresa Dennsi is a 69 y.o. female who was referred by [facility] for a sleep evaluation for sleep disordered breathing The patient  has a past medical history of Premature atrial contractions, PVC (premature ventricular contraction), and Seizure disorder.     The patient reports diurnal fatigue.  The patient reports witnessed snoring noticed by family members. There is witnessed apneas. She has  awoken from a snore and has  gasping for air.   When she wakes up from sleep, she does  feel un refreshed.     Teresa Dennis underwent a home sleep study on 4/18/2024 where the AHI was 51, the RDI was 59, oxygen alberto was 75.8%. The patient weight 149 lbs and had BMI of 28.        Patient does work as admin in [employer]    Sleep summary during the workdays per patient report:  Bedtime: 9 PM  Sleep Latency: 15 min  # Awakenings: 2-3  WASO (wakefulness after sleep onset) a few min  Risetime: 5 AM    Sleep summary during days off per patient report:  Bedtime: 9 PM  Sleep Latency: 15 min  # Awakenings: 2-3   WASO (wakefulness after sleep onset) a few min  Risetime: 6-7 AM    CONTRIBUTING and/or CONFOUNDING FACTORS:    Nocturnal pain:   n    Nocturia >2/night:   n  Heartburn/GI pain:   n  Environment:    n  Bed partner noise/movements : n      Patient provided ESS:    [place] Sleepiness Scale TOTAL   (validated sleepiness questionnaire with a higher score indicating greater sleepiness; range 0-24)      7/9/2024     3:42 PM   EPWORTH SLEEPINESS SCALE   Sitting and reading 3   Watching TV 3   Sitting, inactive in a public place (e.g. a theatre or a meeting) 3   As a passenger in a car for an hour without a break 2   Lying down to rest in the afternoon when circumstances permit 2   Sitting and talking to someone 1   Sitting  quietly after a lunch without alcohol 2   In a car, while stopped for a few minutes in traffic 1   Total score 17           Most Recent Vital Signs:    The patient's body mass index is 28.58 kg/m².    Wt Readings from Last 5 Encounters:   07/09/24 68.6 kg (151 lb 3.8 oz)   04/08/24 68.7 kg (151 lb 7.3 oz)   04/08/24 68.4 kg (150 lb 12.7 oz)   03/21/24 68.7 kg (151 lb 7.3 oz)   03/08/24 68 kg (150 lb)     BMI Readings from Last 5 Encounters:   07/09/24 28.58 kg/m²   04/08/24 28.62 kg/m²   04/08/24 28.49 kg/m²   03/21/24 28.62 kg/m²   03/08/24 28.34 kg/m²     Pulse Readings from Last 3 Encounters:   07/09/24 (!) 46   04/08/24 87   04/08/24 (!) 40         Current Outpatient Medications:     atorvastatin (LIPITOR) 20 MG tablet, TAKE 1 TABLET BY MOUTH EVERY DAY, Disp: 90 tablet, Rfl: 3    carBAMazepine (CARBATROL) 300 MG CM12, TAKE 1 CAPSULE BY MOUTH TWICE A DAY (Patient taking differently: Take 300 mg by mouth once daily.), Disp: 180 capsule, Rfl: 1    ergocalciferol (ERGOCALCIFEROL) 50,000 unit Cap, Take 1 capsule by mouth once a week., Disp: , Rfl:     famotidine (PEPCID AC ORAL), Take 1 tablet by mouth daily as needed (Heartburn)., Disp: , Rfl:     magnesium oxide (MAG-OX) 400 mg (241.3 mg magnesium) tablet, Take 1 daily; if diarrhea occurs, decrease dose, Disp: , Rfl: 0    oxybutynin (DITROPAN-XL) 5 MG TR24, Take 5 mg by mouth once daily., Disp: , Rfl:     risedronate (ACTONEL) 35 MG tablet, Take 35 mg by mouth every 7 days., Disp: , Rfl:     omeprazole (PRILOSEC OTC) 20 MG tablet, Take 1 tablet by mouth once daily., Disp: , Rfl:        ROS    Objective:   Vitals reviewed   Physical Exam  Vitals reviewed.   Constitutional:       Appearance: Normal appearance.   Pulmonary:      Effort: Pulmonary effort is normal.   Neurological:      General: No focal deficit present.      Mental Status: She is alert and oriented to person, place, and time.         Assessment:     Problem List Items Addressed This Visit           Other    TAYLOR (obstructive sleep apnea) - Primary    Relevant Orders    CPAP FOR HOME USE     Other Visit Diagnoses       Excessive daytime sleepiness                  Plan:         The results of the  Home Sleep Study was reviewed with the patient. Patient was educated on the pathophysiology and the prognosis associated with obstructive sleep apnea.     I have discussed with the patient the different treatment modalities for TAYLOR including surgery, hypoglossal nerve stimulator, mandibular advancement device with oral appliance. Patient chose to be treated with PAP device.     Patient has been prescribed at this office visit with:  -  5 -  20 cmH2O  - Interface patient chose that was prescribed today: nasal    Additionally, the following recommendations were also instructed to the patient:  - Use PAP >4hours per night for 7 nights per week  - Use PAP for any daytime sleeping  - Inform other physicians/specialists of your diagnosis of SDB and PAP therapy.  - Prescription for PAP device supplies will be send to the [facility] today. Filter, mask, tube with climate line and humidification system.    Today's office encounter included review of salient clinical notes from others involved in the care of the patient, discrete laboratory elements, relevant imaging and, as available, prior and present intervention for the disturbance of sleep.  The information gleaned was used in establishing the diagnosis and in stratification of disease risk.    Patient benefits from PAP therapy.    RTC 1-3 months after PAP initiation

## 2024-07-15 ENCOUNTER — PATIENT MESSAGE (OUTPATIENT)
Dept: ELECTROPHYSIOLOGY | Facility: CLINIC | Age: 69
End: 2024-07-15
Payer: COMMERCIAL

## 2024-07-16 ENCOUNTER — ANESTHESIA (OUTPATIENT)
Dept: MEDSURG UNIT | Facility: HOSPITAL | Age: 69
End: 2024-07-16
Payer: COMMERCIAL

## 2024-07-16 ENCOUNTER — HOSPITAL ENCOUNTER (OUTPATIENT)
Facility: HOSPITAL | Age: 69
Discharge: HOME | End: 2024-07-16
Attending: INTERNAL MEDICINE | Admitting: INTERNAL MEDICINE
Payer: COMMERCIAL

## 2024-07-16 ENCOUNTER — ANESTHESIA EVENT (OUTPATIENT)
Dept: MEDSURG UNIT | Facility: HOSPITAL | Age: 69
End: 2024-07-16
Payer: COMMERCIAL

## 2024-07-16 VITALS
WEIGHT: 149 LBS | TEMPERATURE: 98 F | BODY MASS INDEX: 28.13 KG/M2 | HEIGHT: 61 IN | HEART RATE: 76 BPM | DIASTOLIC BLOOD PRESSURE: 82 MMHG | SYSTOLIC BLOOD PRESSURE: 154 MMHG | RESPIRATION RATE: 18 BRPM | OXYGEN SATURATION: 95 %

## 2024-07-16 DIAGNOSIS — I49.9 ARRHYTHMIA: ICD-10-CM

## 2024-07-16 DIAGNOSIS — I49.1 PREMATURE ATRIAL CONTRACTIONS: ICD-10-CM

## 2024-07-16 DIAGNOSIS — I49.1 PAC (PREMATURE ATRIAL CONTRACTION): Primary | ICD-10-CM

## 2024-07-16 LAB
OHS QRS DURATION: 86 MS
OHS QTC CALCULATION: 446 MS

## 2024-07-16 PROCEDURE — 93653 COMPRE EP EVAL TX SVT: CPT | Performed by: INTERNAL MEDICINE

## 2024-07-16 PROCEDURE — 93653 COMPRE EP EVAL TX SVT: CPT | Mod: ,,, | Performed by: INTERNAL MEDICINE

## 2024-07-16 PROCEDURE — 25000003 PHARM REV CODE 250: Performed by: NURSE ANESTHETIST, CERTIFIED REGISTERED

## 2024-07-16 PROCEDURE — 93005 ELECTROCARDIOGRAM TRACING: CPT

## 2024-07-16 PROCEDURE — 63600175 PHARM REV CODE 636 W HCPCS: Performed by: INTERNAL MEDICINE

## 2024-07-16 PROCEDURE — C1732 CATH, EP, DIAG/ABL, 3D/VECT: HCPCS | Performed by: INTERNAL MEDICINE

## 2024-07-16 PROCEDURE — 25000003 PHARM REV CODE 250: Performed by: INTERNAL MEDICINE

## 2024-07-16 PROCEDURE — 63600175 PHARM REV CODE 636 W HCPCS: Performed by: NURSE ANESTHETIST, CERTIFIED REGISTERED

## 2024-07-16 PROCEDURE — D9220A PRA ANESTHESIA: Mod: ANES,,, | Performed by: ANESTHESIOLOGY

## 2024-07-16 PROCEDURE — 93010 ELECTROCARDIOGRAM REPORT: CPT | Mod: ,,, | Performed by: INTERNAL MEDICINE

## 2024-07-16 PROCEDURE — 37000008 HC ANESTHESIA 1ST 15 MINUTES: Performed by: INTERNAL MEDICINE

## 2024-07-16 PROCEDURE — 27201423 OPTIME MED/SURG SUP & DEVICES STERILE SUPPLY: Performed by: INTERNAL MEDICINE

## 2024-07-16 PROCEDURE — C1730 CATH, EP, 19 OR FEW ELECT: HCPCS | Performed by: INTERNAL MEDICINE

## 2024-07-16 PROCEDURE — C1894 INTRO/SHEATH, NON-LASER: HCPCS | Performed by: INTERNAL MEDICINE

## 2024-07-16 PROCEDURE — 25000003 PHARM REV CODE 250: Performed by: NURSE PRACTITIONER

## 2024-07-16 PROCEDURE — D9220A PRA ANESTHESIA: Mod: CRNA,,, | Performed by: NURSE ANESTHETIST, CERTIFIED REGISTERED

## 2024-07-16 PROCEDURE — 37000009 HC ANESTHESIA EA ADD 15 MINS: Performed by: INTERNAL MEDICINE

## 2024-07-16 RX ORDER — DEXAMETHASONE SODIUM PHOSPHATE 4 MG/ML
INJECTION, SOLUTION INTRA-ARTICULAR; INTRALESIONAL; INTRAMUSCULAR; INTRAVENOUS; SOFT TISSUE
Status: DISCONTINUED | OUTPATIENT
Start: 2024-07-16 | End: 2024-07-16

## 2024-07-16 RX ORDER — ACETAMINOPHEN 325 MG/1
650 TABLET ORAL EVERY 4 HOURS PRN
Status: DISCONTINUED | OUTPATIENT
Start: 2024-07-16 | End: 2024-07-16 | Stop reason: HOSPADM

## 2024-07-16 RX ORDER — HALOPERIDOL 5 MG/ML
0.5 INJECTION INTRAMUSCULAR EVERY 10 MIN PRN
Status: DISCONTINUED | OUTPATIENT
Start: 2024-07-16 | End: 2024-07-16 | Stop reason: HOSPADM

## 2024-07-16 RX ORDER — LIDOCAINE HYDROCHLORIDE 20 MG/ML
INJECTION, SOLUTION EPIDURAL; INFILTRATION; INTRACAUDAL; PERINEURAL
Status: DISCONTINUED | OUTPATIENT
Start: 2024-07-16 | End: 2024-07-16 | Stop reason: HOSPADM

## 2024-07-16 RX ORDER — ONDANSETRON HYDROCHLORIDE 2 MG/ML
INJECTION, SOLUTION INTRAVENOUS
Status: DISCONTINUED | OUTPATIENT
Start: 2024-07-16 | End: 2024-07-16

## 2024-07-16 RX ORDER — PHENYLEPHRINE HYDROCHLORIDE 10 MG/ML
INJECTION INTRAVENOUS CONTINUOUS PRN
Status: DISCONTINUED | OUTPATIENT
Start: 2024-07-16 | End: 2024-07-16

## 2024-07-16 RX ORDER — FENTANYL CITRATE 50 UG/ML
25 INJECTION, SOLUTION INTRAMUSCULAR; INTRAVENOUS EVERY 5 MIN PRN
Status: DISCONTINUED | OUTPATIENT
Start: 2024-07-16 | End: 2024-07-16 | Stop reason: HOSPADM

## 2024-07-16 RX ORDER — SODIUM CHLORIDE 0.9 % (FLUSH) 0.9 %
10 SYRINGE (ML) INJECTION
Status: DISCONTINUED | OUTPATIENT
Start: 2024-07-16 | End: 2024-07-16 | Stop reason: HOSPADM

## 2024-07-16 RX ORDER — PROPOFOL 10 MG/ML
VIAL (ML) INTRAVENOUS CONTINUOUS PRN
Status: DISCONTINUED | OUTPATIENT
Start: 2024-07-16 | End: 2024-07-16

## 2024-07-16 RX ORDER — HEPARIN SOD,PORCINE/0.9 % NACL 1000/500ML
INTRAVENOUS SOLUTION INTRAVENOUS
Status: DISCONTINUED | OUTPATIENT
Start: 2024-07-16 | End: 2024-07-16 | Stop reason: HOSPADM

## 2024-07-16 RX ADMIN — PROPOFOL 250 MCG/KG/MIN: 10 INJECTION, EMULSION INTRAVENOUS at 02:07

## 2024-07-16 RX ADMIN — DEXAMETHASONE SODIUM PHOSPHATE 4 MG: 4 INJECTION, SOLUTION INTRAMUSCULAR; INTRAVENOUS at 02:07

## 2024-07-16 RX ADMIN — ONDANSETRON 4 MG: 2 INJECTION INTRAMUSCULAR; INTRAVENOUS at 02:07

## 2024-07-16 RX ADMIN — ACETAMINOPHEN 650 MG: 325 TABLET ORAL at 05:07

## 2024-07-16 RX ADMIN — SODIUM CHLORIDE, SODIUM GLUCONATE, SODIUM ACETATE, POTASSIUM CHLORIDE, MAGNESIUM CHLORIDE, SODIUM PHOSPHATE, DIBASIC, AND POTASSIUM PHOSPHATE: .53; .5; .37; .037; .03; .012; .00082 INJECTION, SOLUTION INTRAVENOUS at 02:07

## 2024-07-16 RX ADMIN — PHENYLEPHRINE HYDROCHLORIDE 0.4 MCG/KG/MIN: 10 INJECTION INTRAVENOUS at 03:07

## 2024-07-16 NOTE — H&P
"[facility] - Short Stay Cardiac Unit  Cardiac Electrophysiology  History and Physical     Admission Date: 7/16/2024  Code Status: Prior   Attending Provider: Jose Verma MD   Principal Problem:Premature atrial contractions    Subjective:     Chief Complaint:  Premature atrial contractions     HPI:  is a 69 year old female with a PMHx of     History obtained through patient report and clinic notes:    Last seen in 9/2023. She is a 68F with seizure disorder who was well until 8/2023 when she began to note worsening fatigue. She was also receiving intermittent alarms on her AppleWatch that her HR was in the 30-40s bpm range. In 9/2023 she presented to the ED, was found to be in atrial bigeminy, and started on .Echo showed EF 55-60%, mod LAE.      Notably, pt was at work as a psych nurse. Two weeks ago she was feeling tired at work. Another nurse took vitals, which were notable for HR in the 30s bpm range.     A 48 hr Holter performed in 8/2023 showed sinus rhythm average HR 68 bpm (range  bpm), PAC burden 12.4%, PVC burden 2.2%, no sustained arrhythmias. Multiple HRs in the 30s bpm range occurred during the daytime.     Pt states she started Weight [employer] in 8/2023 and has lost 10-14 lbs since then. Pt had COVID in 7/2023.     At 9/2023 visit HR 47 bpm--pt felt fine. My impression was that symptoms were 2/2 SSS as opposed to ectopy. Discussed pacemaker, but she deferred.     A 24 hr Holter in 11/2023 showed sinus rhythm average HR 82 bpm (range  bpm), PAC burden 27.3%, 5 runs of nonsustained AT longest 10 beats, PVC burden 0.4%, no sustained arrhythmias.     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 This was prompted by a stress echo notable for multiple periods of frequent ectopy and blocked PACs.     4/8/24 During her last visit with Dr. Verma, pt states she has been noting intermittent HENLEY, particularly with climbing stairs. Also intermittently feels "not focused".   My interpretation of " today's ECG is sinus rhythm 84 bpm with PACs occurring in a bigeminy pattern. Pulse check indicates HR in 40s bpm range (nonperfusing PACs).  Symptomatic bradycardia, at this point most likely 2/2 frequent blocked PACs. PAC burden currently stands at 27%. Discussed risks, benefits, indications, alternatives to PAC ablation. Plan is to proceed.  CARTO.    Ms. Dennis presents today to [facility] for scheduled PAC ablation with Dr. Verma. She denies any chest pain, palpitations, SOB, dizziness, light headedness, weakness, syncope, or near syncopal episodes. She does endorse HENLEY and fatigue. She denies any bleeding, infections, fevers, rashes, or surgeries in the past 30 days. She is not on OAC.    ECG today shows sinus rhythm with PACs     Past Medical History:   Diagnosis Date    Premature atrial contractions     PVC (premature ventricular contraction)     Seizure disorder        Past Surgical History:   Procedure Laterality Date     SECTION      CORONARY ANGIOGRAPHY N/A 3/8/2024    Procedure: ANGIOGRAM, CORONARY ARTERY;  Surgeon: Leopoldo Comer Jr., MD;  [facility];  Service: Cardiology;  Laterality: N/A;    ENDOMETRIAL ABLATION      HERNIA REPAIR      Tonsillectomy         Review of patient's allergies indicates:  No Known Allergies    No current facility-administered medications on file prior to encounter.     Current Outpatient Medications on File Prior to Encounter   Medication Sig    atorvastatin (LIPITOR) 20 MG tablet TAKE 1 TABLET BY MOUTH EVERY DAY    carBAMazepine (CARBATROL) 300 MG CM12 TAKE 1 CAPSULE BY MOUTH TWICE A DAY (Patient taking differently: Take 300 mg by mouth once daily.)    ergocalciferol (ERGOCALCIFEROL) 50,000 unit Cap Take 1 capsule by mouth once a week.    oxybutynin (DITROPAN-XL) 5 MG TR24 Take 5 mg by mouth once daily.    famotidine (PEPCID AC ORAL) Take 1 tablet by mouth daily as needed (Heartburn).    magnesium oxide (MAG-OX) 400 mg (241.3 mg magnesium) tablet  Take 1 daily; if diarrhea occurs, decrease dose    omeprazole (PRILOSEC OTC) 20 MG tablet Take 1 tablet by mouth once daily.    risedronate (ACTONEL) 35 MG tablet Take 35 mg by mouth every 7 days.     Family History    None       Tobacco Use    Smoking status: Never    Smokeless tobacco: Never   Substance and Sexual Activity    Alcohol use: Yes     Comment: Socially    Drug use: No    Sexual activity: Yes     Partners: Male     Comment:      Review of Systems   Constitutional: Positive for malaise/fatigue. Negative for chills and fever.   HENT:  Negative for congestion and nosebleeds.    Eyes:  Negative for blurred vision.   Cardiovascular:  Positive for HENLEY. Negative for chest pain, irregular heartbeat, leg swelling, near-syncope, orthopnea, palpitations, paroxysmal nocturnal dyspnea and syncope.   Respiratory:  Negative for cough, hemoptysis, shortness of breath, sleep disturbances due to breathing, sputum production and wheezing.    Endocrine: Negative for polyphagia.   Hematologic/Lymphatic: Negative for bleeding problem. Does not bruise/bleed easily.   Skin:  Negative for itching and rash.   Musculoskeletal:  Negative for back pain, joint swelling, muscle cramps and muscle weakness.   Gastrointestinal:  Negative for bloating, abdominal pain, hematemesis, hematochezia, nausea and vomiting.   Genitourinary:  Negative for dysuria and hematuria.   Neurological:  Negative for dizziness, focal weakness, headaches, light-headedness, loss of balance, numbness and weakness.   Psychiatric/Behavioral:  Negative for altered mental status.      Objective:     Vital Signs (Most Recent):  Temp: 97.5 °F (36.4 °C) (07/16/24 1200)  Pulse: 70 (07/16/24 1200)  Resp: 17 (07/16/24 1200)  BP: (!) 140/78 (07/16/24 1206)  SpO2: 95 % (07/16/24 1200) Vital Signs (24h Range):  Temp:  [97.5 °F (36.4 °C)] 97.5 °F (36.4 °C)  Pulse:  [70] 70  Resp:  [17] 17  SpO2:  [95 %] 95 %  BP: (130-140)/(78) 140/78     Weight: 67.6 kg (149  lb)  Body mass index is 28.15 kg/m².    SpO2: 95 %     Physical Exam  Vitals and nursing note reviewed.   Constitutional:       General: She is not in acute distress.     Appearance: Normal appearance. She is well-developed. She is not diaphoretic.   HENT:      Head: Normocephalic and atraumatic.      Mouth/Throat:      Mouth: Mucous membranes are moist.      Pharynx: No oropharyngeal exudate.   Eyes:      Conjunctiva/sclera: Conjunctivae normal.      Pupils: Pupils are equal, round, and reactive to light.   Cardiovascular:      Rate and Rhythm: Normal rate and regular rhythm. No extrasystoles are present.     Pulses: Normal pulses and intact distal pulses.           Radial pulses are 2+ on the right side and 2+ on the left side.        Dorsalis pedis pulses are 2+ on the right side and 2+ on the left side.      Heart sounds: Normal heart sounds  Pulmonary:      Effort: Pulmonary effort is normal. No accessory muscle usage or respiratory distress.      Breath sounds: Examination of the left-middle and left-lower field reveals very mild expiratory wheezing. No decreased breath sounds, rhonchi or rales.   Chest:      Chest wall: No tenderness.   Abdominal:      General: Bowel sounds are normal. There is no distension.      Palpations: Abdomen is soft.      Tenderness: There is no abdominal tenderness. There is no guarding.   Musculoskeletal:         General: Normal range of motion.      Cervical back: Normal range of motion and neck supple.   Skin:     General: Skin is warm and dry.      Findings: No erythema or rash.   Neurological:      Mental Status: She is alert and oriented to person, place, and time. She is not disoriented.      Sensory: No sensory deficit.      Motor: No abnormal muscle tone.      Coordination: Coordination normal.      Gait: Gait normal.   Psychiatric:         Mood and Affect: Mood normal.         Behavior: Behavior normal.         Thought Content: Thought content normal.         Judgment:  Judgment normal.     Significant Labs: Pre procedure labs from today reviewed    Significant Imaging:  ECG  Assessment and Plan:   Plan:  -PAC RFA  -Anesthesia for sedation    Prior to procedure, Dr. Verma at bedside speaking with patient and family. Extensive discussion with patient regarding the nature of RFA PAC including transseptal puncture. We discussed risks and benefits at length. Our discussion included, but was not limited to the risk of death, infection, bleeding, stroke, MI, cardiac perforation, embolism, cardiac tamponade, vascular injury, AE fistula, injury to phrenic nerve, pulmonary vein stenosis and other organic injury including the possibility for need for surgery or pacemaker implantation. We discussed success rates and possible need for redo procedures. Patient verbalized understanding. All questions answered, no further questions or concerns, patient would like to proceed. Consents signed.    Brynn Ramirez NP  Cardiac Electrophysiology  [facility] - Short Stay Cardiac Unit    Attending: Jose Verma MD

## 2024-07-16 NOTE — SUBJECTIVE & OBJECTIVE
Past Medical History:   Diagnosis Date    Premature atrial contractions     PVC (premature ventricular contraction)     Seizure disorder        Past Surgical History:   Procedure Laterality Date     SECTION      CORONARY ANGIOGRAPHY N/A 3/8/2024    Procedure: ANGIOGRAM, CORONARY ARTERY;  Surgeon: Leopoldo Comer Jr., MD;  Location: [facility] CATH LAB;  Service: Cardiology;  Laterality: N/A;    ENDOMETRIAL ABLATION      HERNIA REPAIR      Tonsillectomy         Review of patient's allergies indicates:  No Known Allergies    No current facility-administered medications on file prior to encounter.     Current Outpatient Medications on File Prior to Encounter   Medication Sig    atorvastatin (LIPITOR) 20 MG tablet TAKE 1 TABLET BY MOUTH EVERY DAY    carBAMazepine (CARBATROL) 300 MG CM12 TAKE 1 CAPSULE BY MOUTH TWICE A DAY (Patient taking differently: Take 300 mg by mouth once daily.)    ergocalciferol (ERGOCALCIFEROL) 50,000 unit Cap Take 1 capsule by mouth once a week.    oxybutynin (DITROPAN-XL) 5 MG TR24 Take 5 mg by mouth once daily.    famotidine (PEPCID AC ORAL) Take 1 tablet by mouth daily as needed (Heartburn).    magnesium oxide (MAG-OX) 400 mg (241.3 mg magnesium) tablet Take 1 daily; if diarrhea occurs, decrease dose    omeprazole (PRILOSEC OTC) 20 MG tablet Take 1 tablet by mouth once daily.    risedronate (ACTONEL) 35 MG tablet Take 35 mg by mouth every 7 days.     Family History    None       Tobacco Use    Smoking status: Never    Smokeless tobacco: Never   Substance and Sexual Activity    Alcohol use: Yes     Comment: Socially    Drug use: No    Sexual activity: Yes     Partners: Male     Comment:      Review of Systems   Constitutional: Positive for malaise/fatigue. Negative for chills and fever.   HENT:  Negative for congestion and nosebleeds.    Eyes:  Negative for blurred vision.   Cardiovascular:  Negative for chest pain, dyspnea on exertion, irregular heartbeat, leg swelling,  near-syncope, orthopnea, palpitations, paroxysmal nocturnal dyspnea and syncope.   Respiratory:  Negative for cough, hemoptysis, shortness of breath, sleep disturbances due to breathing, sputum production and wheezing.    Endocrine: Negative for polyphagia.   Hematologic/Lymphatic: Negative for bleeding problem. Does not bruise/bleed easily.   Skin:  Negative for itching and rash.   Musculoskeletal:  Negative for back pain, joint swelling, muscle cramps and muscle weakness.   Gastrointestinal:  Negative for bloating, abdominal pain, hematemesis, hematochezia, nausea and vomiting.   Genitourinary:  Negative for dysuria and hematuria.   Neurological:  Negative for dizziness, focal weakness, headaches, light-headedness, loss of balance, numbness and weakness.   Psychiatric/Behavioral:  Negative for altered mental status.      Objective:     Vital Signs (Most Recent):  Temp: 97.5 °F (36.4 °C) (07/16/24 1200)  Pulse: 70 (07/16/24 1200)  Resp: 17 (07/16/24 1200)  BP: (!) 140/78 (07/16/24 1206)  SpO2: 95 % (07/16/24 1200) Vital Signs (24h Range):  Temp:  [97.5 °F (36.4 °C)] 97.5 °F (36.4 °C)  Pulse:  [70] 70  Resp:  [17] 17  SpO2:  [95 %] 95 %  BP: (130-140)/(78) 140/78       Weight: 67.6 kg (149 lb)  Body mass index is 28.15 kg/m².    SpO2: 95 %        Physical Exam  Vitals and nursing note reviewed.   Constitutional:       General: She is not in acute distress.     Appearance: Normal appearance. She is well-developed. She is not diaphoretic.   HENT:      Head: Normocephalic and atraumatic.      Mouth/Throat:      Mouth: Mucous membranes are moist.      Pharynx: No oropharyngeal exudate.   Eyes:      Conjunctiva/sclera: Conjunctivae normal.      Pupils: Pupils are equal, round, and reactive to light.   Cardiovascular:      Rate and Rhythm: Normal rate and regular rhythm. No extrasystoles are present.     Pulses: Normal pulses and intact distal pulses.           Radial pulses are 2+ on the right side and 2+ on the left  side.        Dorsalis pedis pulses are 2+ on the right side and 2+ on the left side.      Heart sounds: Normal heart sounds, S1 normal and S2 normal. No murmur heard.  Pulmonary:      Effort: Pulmonary effort is normal. No accessory muscle usage or respiratory distress.      Breath sounds: Examination of the left-middle field reveals wheezing. Examination of the left-lower field reveals wheezing. Wheezing present. No decreased breath sounds, rhonchi or rales.      Comments: Mild expiratory wheeze  Chest:      Chest wall: No tenderness.   Abdominal:      General: Bowel sounds are normal. There is no distension.      Palpations: Abdomen is soft.      Tenderness: There is no abdominal tenderness. There is no guarding.   Musculoskeletal:         General: Normal range of motion.      Cervical back: Normal range of motion and neck supple.   Skin:     General: Skin is warm and dry.      Findings: No erythema or rash.   Neurological:      Mental Status: She is alert and oriented to person, place, and time. She is not disoriented.      Sensory: No sensory deficit.      Motor: No abnormal muscle tone.      Coordination: Coordination normal.      Gait: Gait normal.   Psychiatric:         Mood and Affect: Mood normal.         Behavior: Behavior normal.         Thought Content: Thought content normal.         Judgment: Judgment normal.            Significant Labs: Pre procedure labs from today reviewed    Significant Imaging:  ECG

## 2024-07-16 NOTE — PLAN OF CARE
Patient arrived to room. PIV placed, x2. Admit assessment completed. Plan of care discussed with patient. Will monitor. Call light within reach, instructed in use.

## 2024-07-16 NOTE — TRANSFER OF CARE
"Anesthesia Transfer of Care Note    Patient: Teresa Dennis    Procedure(s) Performed: Procedure(s) (LRB):  Ablation (N/A)    Patient location: PACU    Anesthesia Type: general    Transport from OR: Transported from OR on 6-10 L/min O2 by face mask with adequate spontaneous ventilation    Post pain: adequate analgesia    Post assessment: no apparent anesthetic complications    Post vital signs: stable    Level of consciousness: awake, alert and oriented    Nausea/Vomiting: no nausea/vomiting    Complications: none    Transfer of care protocol was followed      Last vitals: Visit Vitals  /81 (BP Location: Left arm, Patient Position: Lying)   Pulse 63   Temp 36.6 °C (97.9 °F) (Temporal)   Resp (!) 21   Ht 5' 1" (1.549 m)   Wt 67.6 kg (149 lb)   SpO2 100%   Breastfeeding No   BMI 28.15 kg/m²     "

## 2024-07-16 NOTE — ANESTHESIA PREPROCEDURE EVALUATION
2024  Pre-operative evaluation for Procedure(s) (LRB):  Ablation (N/A)    Teresa Dennis is a 69 y.o. female     Past Medical History:   Diagnosis Date    Premature atrial contractions     PVC (premature ventricular contraction)     Seizure disorder      Patient Active Problem List   Diagnosis    Seizure disorder    Premature atrial contractions    Pure hypercholesterolemia    Symptomatic bradycardia    Age-related osteoporosis without current pathological fracture    Sleep-disordered breathing    TAYLOR (obstructive sleep apnea)     Review of patient's allergies indicates:  No Known Allergies    No current facility-administered medications on file prior to encounter.     Current Outpatient Medications on File Prior to Encounter   Medication Sig Dispense Refill    atorvastatin (LIPITOR) 20 MG tablet TAKE 1 TABLET BY MOUTH EVERY DAY 90 tablet 3    carBAMazepine (CARBATROL) 300 MG CM12 TAKE 1 CAPSULE BY MOUTH TWICE A DAY (Patient taking differently: Take 300 mg by mouth once daily.) 180 capsule 1    ergocalciferol (ERGOCALCIFEROL) 50,000 unit Cap Take 1 capsule by mouth once a week.      oxybutynin (DITROPAN-XL) 5 MG TR24 Take 5 mg by mouth once daily.      famotidine (PEPCID AC ORAL) Take 1 tablet by mouth daily as needed (Heartburn).      magnesium oxide (MAG-OX) 400 mg (241.3 mg magnesium) tablet Take 1 daily; if diarrhea occurs, decrease dose  0    omeprazole (PRILOSEC OTC) 20 MG tablet Take 1 tablet by mouth once daily.      risedronate (ACTONEL) 35 MG tablet Take 35 mg by mouth every 7 days.         Past Surgical History:   Procedure Laterality Date     SECTION      CORONARY ANGIOGRAPHY N/A 3/8/2024    Procedure: ANGIOGRAM, CORONARY ARTERY;  Surgeon: Leopoldo Comer Jr., MD;  Location: [facility] CATH LAB;  Service: Cardiology;  Laterality: N/A;    ENDOMETRIAL ABLATION      HERNIA  "REPAIR      Tonsillectomy         CBC: No results for input(s): "WBC", "HGB", "HCT", "PLT" in the last 72 hours.    CMP: No results for input(s): "NA", "K", "CL", "CO2", "BUN", "CREATININE", "GLU", "MG", "PHOS", "CALCIUM", "ALBUMIN", "PROT", "ALKPHOS", "ALT", "AST", "BILITOT" in the last 72 hours.    COAGS  No results for input(s): "PT", "INR", "PROTIME", "APTT" in the last 72 hours.    BP Readings from Last 3 Encounters:   07/16/24 (!) 140/78   07/09/24 122/70   04/08/24 (!) 146/80       Diagnostic Studies:    EKG:  Results for orders placed or performed during the hospital encounter of 09/07/23   EKG 12-lead    Collection Time: 09/07/23  5:53 PM    Narrative    Test Reason : R00.1,    Vent. Rate : 043 BPM     Atrial Rate : 043 BPM     P-R Int : 154 ms          QRS Dur : 082 ms      QT Int : 496 ms       P-R-T Axes : 051 001 045 degrees     QTc Int : 419 ms    Marked sinus bradycardia  Nonspecific ST and T wave abnormality  Abnormal ECG  When compared with ECG of 07-SEP-2023 06:53,  Premature atrial complexes are no longer Present  Vent. rate has decreased BY  40 BPM  Nonspecific T wave abnormality no longer evident in Inferior leads  Inverted T waves have replaced nonspecific T wave abnormality in Anterior  leads  Nonspecific T wave abnormality no longer evident in Lateral leads  QT has lengthened  Confirmed by Cm Hunt MD ([id number]) on 9/8/2023 9:27:23 AM    Referred By: MARLA   SELF           Confirmed By:Cm Hunt MD       2D Echo:  Results for orders placed during the hospital encounter of 09/07/23    Echo    Interpretation Summary    Left Ventricle: The left ventricle is normal in size. Normal wall thickness. There is concentric remodeling. Normal wall motion. There is normal systolic function with a visually estimated ejection fraction of 55 - 60%. There is normal diastolic function.    Right Ventricle: Normal right ventricular cavity size. Wall thickness is normal. Right ventricle wall motion  " is normal. Systolic function is normal.    Left Atrium: Left atrium is moderately dilated.    Pulmonary Artery: The estimated pulmonary artery systolic pressure is 32 mmHg.    IVC/SVC: Normal venous pressure at 3 mmHg.        Pre-op Assessment    I have reviewed the Patient Summary Reports.     I have reviewed the Nursing Notes. I have reviewed the NPO Status.      Review of Systems  Hematology/Oncology:  Hematology Normal   Oncology Normal                                   EENT/Dental:  EENT/Dental Normal           Cardiovascular:                hyperlipidemia                         Disorder of Cardiac Rhythm, Premature Atrial Contraction, symptomatic     Pulmonary:        Sleep Apnea                Renal/:  Renal/ Normal                 Hepatic/GI:  Hepatic/GI Normal                 Musculoskeletal:  Musculoskeletal Normal                Neurological:       Seizures (last seizure >35 years ago), well controlled                                Endocrine:  Endocrine Normal            Dermatological:  Skin Normal    Psych:  Psychiatric Normal                    Physical Exam  General: Well nourished, Cooperative, Alert and Oriented    Airway:  Mallampati: II   Mouth Opening: Normal  TM Distance: Normal  Neck ROM: Normal ROM        Anesthesia Plan  Type of Anesthesia, risks & benefits discussed:    Anesthesia Type: Gen Natural Airway  Intra-op Monitoring Plan: Standard ASA Monitors  Post Op Pain Control Plan: multimodal analgesia  Induction:  IV  Informed Consent: Informed consent signed with the Patient and all parties understand the risks and agree with anesthesia plan.  All questions answered.   ASA Score: 2    Ready For Surgery From Anesthesia Perspective.     .

## 2024-07-16 NOTE — HPI
"Jeannie in follow-up for her history of ectopy. Last seen in 9/2023. She is a 68F with seizure disorder who was well until 8/2023 when she began to note worsening fatigue. She was also receiving intermittent alarms on her AppleWatch that her HR was in the 30-40s bpm range. In 9/2023 she presented to the ED, was found to be in atrial bigeminy, and started on .Echo showed EF 55-60%, mod LAE.      Notably, pt was at work as a psych nurse. Two weeks ago she was feeling tired at work. Another nurse took vitals, which were notable for HR in the 30s bpm range.     A 48 hr Holter performed in 8/2023 showed sinus rhythm average HR 68 bpm (range  bpm), PAC burden 12.4%, PVC burden 2.2%, no sustained arrhythmias. Multiple HRs in the 30s bpm range occurred during the daytime.     Pt states she started Weight Watchers in 8/2023 and has lost 10-14 lbs since then. Pt had COVID in 7/2023.     At 9/2023 visit HR 47 bpm--pt felt fine. My impression was that symptoms were 2/2 SSS as opposed to ectopy. Discussed pacemaker, but she deferred.     A 24 hr Holter in 11/2023 showed sinus rhythm average HR 82 bpm (range  bpm), PAC burden 27.3%, 5 runs of nonsustained AT longest 10 beats, PVC burden 0.4%, no sustained arrhythmias.     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 This was prompted by a stress echo notable for multiple periods of frequent ectopy and blocked PACs.     Today in the office pt states she has been noting intermittent HENLEY, particularly with climbing stairs. Also intermittently feels "not focused".      My interpretation of today's ECG is sinus rhythm 84 bpm with PACs occurring in a bigeminy pattern. Pulse check indicates HR in 40s bpm range (nonperfusing PACs).    4/8/24 In summary, Teresa Dennis is a 68F with a history of symptomatic bradycardia, at this point most likely 2/2 frequent blocked PACs. PAC burden currently stands at 27%. Discussed risks, benefits, indications, alternatives to PAC ablation. " Plan is to proceed.     CARTO.    Ms. Dennis presents today to [facility] for scheduled PAC ablation with Dr. Verma. She denies any chest pain, palpitations, SOB, HENLEY, dizziness, light headedness, weakness, syncope, or near syncopal episodes. She denies any bleeding, infections, fevers, rashes, or surgeries in the past 30 days. She is not on OAC.    ECG today shows sinus rhythm with PACs

## 2024-07-16 NOTE — DISCHARGE INSTRUCTIONS
"Ablation Discharge Instructions and Care of Your Groin      Follow up:  Follow up with Dr. Verma in 3 months.    Medications:  Continue all home medications  You may experience chest discomfort (also known as "pericarditis") with deep breathes, coughing, and/or laying down which is typically normal following your procedure. If this occurs, you can take ibuprofen (Motrin) 800 mg every 8 hours for 2-3 days. If the chest pain is persistent or severe please visit the nearest emergency department.    Groin site management, precautions, and restrictions:  Remove the bandages over your groin area the morning after your procedure. You can shower after you remove these bandages. Wash the sites at least once daily with soap and water. Keep the groin sites clean and dry. You do not need to apply ointments or bandages to the area.   Wear loose clothes and loose underwear.  Do not take a bath or submerge your groin area (for example: Jacuzzi, swimming pool, or lake) or at least 1 week or when the puncture sites in your groin have completely healed.     If bleeding should occur at the groin sites following discharge:  If oozing from groin site occurs, lay down and apply pressure to the puncture site with your fingers without letting up for 15 minutes and lay flat for 1 hour. If bleeding has resolved, you can continue to monitor. If the bleeding continues or there is significant swelling or pain in the groin area, please call 911 immediately and visit the nearest ER for evaluation and treatment. Do not drive yourself to the hospital. DO NOT STOP TAKING YOUR BLOOD THINNER UNLESS INSTRUCTED BY A PHYSICIAN.     Activity Instructions:  Do not drive or operate any dangerous machinery for 24 hours, but optimally 48-72 hours since you were given general anesthesia.   Do not strain during bowel movements for the first 3 to 4 days after the procedure to prevent bleeding from the groin sites.  Avoid heavy lifting (more than 10 pounds) and " pushing or pulling heavy objects for the first 5 to 7 days after the procedure.  Do not participate in strenuous activities for 5 days after the procedure. This includes most sports - jogging, golfing, play tennis, and bowling.  You may climb stairs if needed, but walk up and down the stairs more slowly than usual.  Gradually increase your activities until you reach your normal activity level within one week after the procedure.    Go to the Emergency Department if you develop:   Change in color or temperature of the leg  Redness, warmth, or drainage/pus at the groin sites  Chills or fever greater than 101.0 F   Severe bleeding or swelling at the groin sites  Acute Weakness or numbness   Visual, gait or speech disturbances   New chest pain, palpitations, shortness of breath, fainting

## 2024-07-17 LAB
OHS QRS DURATION: 94 MS
OHS QTC CALCULATION: 478 MS

## 2024-07-17 NOTE — NURSING TRANSFER
Nursing Transfer Note      7/16/2024   7:03 PM    Transfer To: [facility]    Remaining in place; nurse change    Telemetry: Rate 76    4eyes on Skin: yes    Medicines sent: None    Any special needs or follow-up needed: Bed Rest until approximately 20:00    Patient belongings transferred with patient: No    Chart send with patient: Yes    Notified: spouse    Patient reassessed at: 7/16/2024  18:50 (date, time)

## 2024-07-17 NOTE — ANESTHESIA POSTPROCEDURE EVALUATION
Anesthesia Post Evaluation    Patient: Teresa Dennis    Procedure(s) Performed: Procedure(s) (LRB):  Ablation (N/A)    Final Anesthesia Type: general      Patient location during evaluation: PACU  Patient participation: Yes- Able to Participate  Level of consciousness: awake and alert  Post-procedure vital signs: reviewed and stable  Pain management: adequate  Airway patency: patent    PONV status at discharge: No PONV  Anesthetic complications: no      Cardiovascular status: blood pressure returned to baseline  Respiratory status: unassisted  Hydration status: euvolemic  Follow-up not needed.              Vitals Value Taken Time   /82 07/16/24 1946   Temp 36.7 °C (98 °F) 07/16/24 1845   Pulse 94 07/16/24 1953   Resp 0 07/16/24 1953   SpO2 94 % 07/16/24 1953   Vitals shown include unfiled device data.      No case tracking events are documented in the log.      Pain/Mellisa Score: Pain Rating Prior to Med Admin: 4 (7/16/2024  5:24 PM)  Pain Rating Post Med Admin: 2 (7/16/2024  6:00 PM)  Mellisa Score: 10 (7/16/2024  5:15 PM)

## 2024-07-17 NOTE — DISCHARGE SUMMARY
[facility] - Short Stay Cardiac Unit  Cardiac Electrophysiology  Discharge Summary      Patient Name: Teresa Dennis  MRN: [id number]  Admission Date: 7/16/2024  Hospital Length of Stay: 0 days  Discharge Date and Time: 7/16/2024  8:00 PM  Attending Physician: Jose Verma MD   Discharging Provider: Brynn Ramirez NP  Primary Care Physician: Zeinab, Primary Doctor    HPI:  is a 69 year old female with a PMHx of      History obtained through patient report and clinic notes:     Last seen in 9/2023. She is a 68F with seizure disorder who was well until 8/2023 when she began to note worsening fatigue. She was also receiving intermittent alarms on her AppleWatch that her HR was in the 30-40s bpm range. In 9/2023 she presented to the ED, was found to be in atrial bigeminy, and started on .Echo showed EF 55-60%, mod LAE.      Notably, pt was at work as a psych nurse. Two weeks ago she was feeling tired at work. Another nurse took vitals, which were notable for HR in the 30s bpm range.     A 48 hr Holter performed in 8/2023 showed sinus rhythm average HR 68 bpm (range  bpm), PAC burden 12.4%, PVC burden 2.2%, no sustained arrhythmias. Multiple HRs in the 30s bpm range occurred during the daytime.     Pt states she started Weight Watchers in 8/2023 and has lost 10-14 lbs since then. Pt had COVID in 7/2023.     At 9/2023 visit HR 47 bpm--pt felt fine. My impression was that symptoms were 2/2 SSS as opposed to ectopy. Discussed pacemaker, but she deferred.     A 24 hr Holter in 11/2023 showed sinus rhythm average HR 82 bpm (range  bpm), PAC burden 27.3%, 5 runs of nonsustained AT longest 10 beats, PVC burden 0.4%, no sustained arrhythmias.     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 This was prompted by a stress echo notable for multiple periods of frequent ectopy and blocked PACs.     4/8/24 During her last visit with Dr. Verma, pt states she has been noting intermittent HENLEY, particularly with climbing  "stairs. Also intermittently feels "not focused".   My interpretation of today's ECG is sinus rhythm 84 bpm with PACs occurring in a bigeminy pattern. Pulse check indicates HR in 40s bpm range (nonperfusing PACs).  Symptomatic bradycardia, at this point most likely 2/2 frequent blocked PACs. PAC burden currently stands at 27%. Discussed risks, benefits, indications, alternatives to PAC ablation. Plan is to proceed.  CARTO.     Ms. Dennis presents today to [facility] for scheduled PAC ablation with Dr. Verma. She denies any chest pain, palpitations, SOB, dizziness, light headedness, weakness, syncope, or near syncopal episodes. She does endorse HENLEY and fatigue. She denies any bleeding, infections, fevers, rashes, or surgeries in the past 30 days. She is not on OAC.     ECG today shows sinus rhythm with PACs   Procedure(s) (LRB):  Ablation (N/A)     Indwelling Lines/Drains at time of discharge:  Lines/Drains/Airways    none    Hospital Course: Patient status-post successful PAC ablation, with superior septal EUGENE (see procedure note for details), tolerated procedure well with no acute complications. Admitted to PACU, post-procedure ECG showed sinus rhythm. Bilateral groin sites with dressings C/D/I. No bleeding, hematoma, or bruit noted. Bedrest completed x 4 hours. Patient was monitored on telemetry, no acute arrhythmias. Patient ambulating, tolerating PO intake, and voiding with no issues. Denies any chest pain or SOB. Discharge plans discussed with Dr. Verma. Patient to continue all home medications. Follow up with Dr. Verma in 3 months. Ms. Dennis was assessed at bedside prior to discharge. She reported feeling well and denied chest discomfort, shortness of breath, palpitations, lightheadedness, or any other acute symptoms. Discharge plans/instructions discussed with patient and family who verbalized understanding and agreement of plans of care. No further questions or concerns voiced at this time. Patient was discharged " "home in stable condition.     Goals of Care Treatment Preferences:  Code Status: Full Code    Consults: none    Significant Diagnostic Studies: N/A    Final Active Diagnoses:    Diagnosis Date Noted POA    PRINCIPAL PROBLEM:  Premature atrial contractions [I49.1]  Yes     Chronic      Problems Resolved During this Admission:     No new Assessment & Plan notes have been filed under this hospital service since the last note was generated.  Service: Arrhythmia      Discharged Condition: stable    Disposition: Home or Self Care    Follow Up:   Follow-up Information       Jose Verma MD Follow up in 3 month(s).    Specialties: Electrophysiology, Cardiovascular Disease, Cardiology  Why: Post ablation  Contact information:  [address]  [facility] [address]  [phone]                           Patient Instructions:      No driving until:   Order Comments: No driving or operating heavy machinery for 24-48 hours after your procedure because you received sedation.     Other restrictions (specify):   Order Comments: Ablation Discharge Instructions and Care of Your Groin      Follow up:  · Follow up with Dr. Verma in 3 months.    Medications:  · Continue all home medications  · You may experience chest discomfort (also known as "pericarditis") with deep breathes, coughing, and/or laying down which is typically normal following your procedure. If this occurs, you can take ibuprofen (Motrin) 800 mg every 8 hours for 2-3 days. If the chest pain is persistent or severe please visit the nearest emergency department.    Groin site management, precautions, and restrictions:  · Remove the bandages over your groin area the morning after your procedure. You can shower after you remove these bandages. Wash the sites at least once daily with soap and water. Keep the groin sites clean and dry. You do not need to apply ointments or bandages to the area.   · Wear loose clothes and loose underwear.  · Do not take a bath or " submerge your groin area (for example: Jacuzzi, swimming pool, or lake) or at least 1 week or when the puncture sites in your groin have completely healed.     If bleeding should occur at the groin sites following discharge:  · If oozing from groin site occurs, lay down and apply pressure to the puncture site with your fingers without letting up for 15 minutes and lay flat for 1 hour. If bleeding has resolved, you can continue to monitor. If the bleeding continues or there is significant swelling or pain in the groin area, please call 911 immediately and visit the nearest ER for evaluation and treatment. Do not drive yourself to the hospital. DO NOT STOP TAKING YOUR BLOOD THINNER UNLESS INSTRUCTED BY A PHYSICIAN.     Activity Instructions:  · Do not drive or operate any dangerous machinery for 24 hours, but optimally 48-72 hours since you were given general anesthesia.   · Do not strain during bowel movements for the first 3 to 4 days after the procedure to prevent bleeding from the groin sites.  · Avoid heavy lifting (more than 10 pounds) and pushing or pulling heavy objects for the first 5 to 7 days after the procedure.  · Do not participate in strenuous activities for 5 days after the procedure. This includes most sports - jogging, golfing, play tennis, and bowling.  · You may climb stairs if needed, but walk up and down the stairs more slowly than usual.  · Gradually increase your activities until you reach your normal activity level within one week after the procedure.    Go to the Emergency Department if you develop:   · Change in color or temperature of the leg  · Redness, warmth, or drainage/pus at the groin sites  · Chills or fever greater than 101.0 F   · Severe bleeding or swelling at the groin sites  · Acute Weakness or numbness   · Visual, gait or speech disturbances   · New chest pain, palpitations, shortness of breath, fainting     Lifting restrictions     Notify your health care provider if you  experience any of the following:  increased confusion or weakness     Notify your health care provider if you experience any of the following:  persistent dizziness, light-headedness, or visual disturbances     Notify your health care provider if you experience any of the following:  worsening rash     Notify your health care provider if you experience any of the following:  severe persistent headache     Notify your health care provider if you experience any of the following:  difficulty breathing or increased cough     Notify your health care provider if you experience any of the following:  redness, tenderness, or signs of infection (pain, swelling, redness, odor or green/yellow discharge around incision site)     Notify your health care provider if you experience any of the following:  severe uncontrolled pain     Notify your health care provider if you experience any of the following:  persistent nausea and vomiting or diarrhea     Notify your health care provider if you experience any of the following:  temperature >100.4     Remove dressing in 24 hours     Weight bearing restrictions (specify):     Shower on day dressing removed (No bath)     Medications:  Reconciled Home Medications:      Medication List        CHANGE how you take these medications      carBAMazepine 300 MG Cm12  Commonly known as: CARBATROL  TAKE 1 CAPSULE BY MOUTH TWICE A DAY  What changed: when to take this            CONTINUE taking these medications      atorvastatin 20 MG tablet  Commonly known as: LIPITOR  TAKE 1 TABLET BY MOUTH EVERY DAY     ergocalciferol 50,000 unit Cap  Commonly known as: ERGOCALCIFEROL  Take 1 capsule by mouth once a week.     magnesium oxide 400 mg (241.3 mg magnesium) tablet  Commonly known as: MAG-OX  Take 1 daily; if diarrhea occurs, decrease dose     oxybutynin 5 MG Tr24  Commonly known as: DITROPAN-XL  Take 5 mg by mouth once daily.     PEPCID AC ORAL  Take 1 tablet by mouth daily as needed (Heartburn).      risedronate 35 MG tablet  Commonly known as: ACTONEL  Take 35 mg by mouth every 7 days.            ASK your doctor about these medications      omeprazole 20 MG tablet  Commonly known as: PRILOSEC OTC  Take 1 tablet by mouth once daily.            Plan:  -Continue home medications  -Follow up with Dr. Verma in 3 months    Time spent on the discharge of patient: 10 minutes    Brynn Ramirez NP  Cardiac Electrophysiology  [facility] - Short Stay Cardiac Unit    Attending: Jose Verma MD

## 2024-07-17 NOTE — NURSING
PACU, ROSA Dennis at bedside for handoff. Pt's vs wnl and pt is free from s/s of pain/distress. Pt and pt's  & daughter given discharge paperwork and education reiterated. All questions and concerns addressed. Pt able to drink, eat, walk, and  use restroom without issues. Pt's vs wnl and pt is free from s/s of distress/pain. Pt's marco groins have Gauze/tegaderm in place and sites are free from s/s of bleeding. IV and tele removed. Pt waiting to be wheeled out.

## 2024-07-25 ENCOUNTER — PATIENT MESSAGE (OUTPATIENT)
Dept: SLEEP MEDICINE | Facility: CLINIC | Age: 69
End: 2024-07-25
Payer: COMMERCIAL

## 2024-08-09 ENCOUNTER — TELEPHONE (OUTPATIENT)
Dept: ELECTROPHYSIOLOGY | Facility: CLINIC | Age: 69
End: 2024-08-09
Payer: COMMERCIAL

## 2024-08-13 ENCOUNTER — PATIENT MESSAGE (OUTPATIENT)
Dept: CARDIOLOGY | Facility: CLINIC | Age: 69
End: 2024-08-13
Payer: COMMERCIAL

## 2024-09-09 ENCOUNTER — HOSPITAL ENCOUNTER (OUTPATIENT)
Dept: CARDIOLOGY | Facility: HOSPITAL | Age: 69
Discharge: HOME | End: 2024-09-09
Attending: INTERNAL MEDICINE
Payer: COMMERCIAL

## 2024-09-09 VITALS
DIASTOLIC BLOOD PRESSURE: 70 MMHG | SYSTOLIC BLOOD PRESSURE: 142 MMHG | BODY MASS INDEX: 28.13 KG/M2 | WEIGHT: 149 LBS | HEIGHT: 61 IN | HEART RATE: 60 BPM

## 2024-09-09 DIAGNOSIS — R00.1 SYMPTOMATIC BRADYCARDIA: Chronic | ICD-10-CM

## 2024-09-09 DIAGNOSIS — I49.1 PREMATURE ATRIAL CONTRACTIONS: Chronic | ICD-10-CM

## 2024-09-09 LAB
ASCENDING AORTA: 2.91 CM
AV INDEX (PROSTH): 0.87
AV MEAN GRADIENT: 3 MMHG
AV PEAK GRADIENT: 5 MMHG
AV VALVE AREA BY VELOCITY RATIO: 2.85 CM²
AV VALVE AREA: 2.71 CM²
AV VELOCITY RATIO: 0.92
BSA FOR ECHO PROCEDURE: 1.71 M2
CV ECHO LV RWT: 0.3 CM
DOP CALC AO PEAK VEL: 1.09 M/S
DOP CALC AO VTI: 28.71 CM
DOP CALC LVOT AREA: 3.1 CM2
DOP CALC LVOT DIAMETER: 1.99 CM
DOP CALC LVOT PEAK VEL: 1 M/S
DOP CALC LVOT STROKE VOLUME: 77.78 CM3
DOP CALCLVOT PEAK VEL VTI: 25.02 CM
E WAVE DECELERATION TIME: 200.97 MSEC
E/A RATIO: 1.16
E/E' RATIO: 5.8 M/S
ECHO LV POSTERIOR WALL: 0.8 CM (ref 0.6–1.1)
FRACTIONAL SHORTENING: 39 % (ref 28–44)
GLOBAL LONGITUIDAL STRAIN: 17 %
HR MV ECHO: 60 BPM
INTERVENTRICULAR SEPTUM: 0.79 CM (ref 0.6–1.1)
LA MAJOR: 4.73 CM
LA MINOR: 4.53 CM
LA WIDTH: 3.99 CM
LEFT ATRIUM SIZE: 4.04 CM
LEFT ATRIUM VOLUME INDEX MOD: 20.6 ML/M2
LEFT ATRIUM VOLUME INDEX: 38 ML/M2
LEFT ATRIUM VOLUME MOD: 34.45 CM3
LEFT ATRIUM VOLUME: 63.41 CM3
LEFT INTERNAL DIMENSION IN SYSTOLE: 3.32 CM (ref 2.1–4)
LEFT VENTRICLE DIASTOLIC VOLUME INDEX: 84.5 ML/M2
LEFT VENTRICLE DIASTOLIC VOLUME: 141.11 ML
LEFT VENTRICLE MASS INDEX: 92 G/M2
LEFT VENTRICLE SYSTOLIC VOLUME INDEX: 26.9 ML/M2
LEFT VENTRICLE SYSTOLIC VOLUME: 44.88 ML
LEFT VENTRICULAR INTERNAL DIMENSION IN DIASTOLE: 5.4 CM (ref 3.5–6)
LEFT VENTRICULAR MASS: 153.74 G
LV LATERAL E/E' RATIO: 4.46 M/S
LV SEPTAL E/E' RATIO: 8.29 M/S
MV A" WAVE DURATION": 8.85 MSEC
MV PEAK A VEL: 0.5 M/S
MV PEAK E VEL: 0.58 M/S
MV STENOSIS PRESSURE HALF TIME: 58.28 MS
MV VALVE AREA P 1/2 METHOD: 3.77 CM2
OHS LV EJECTION FRACTION SIMPSONS BIPLANE MOD: 48 %
PISA TR MAX VEL: 2.83 M/S
PULM VEIN S/D RATIO: 1.16
PV PEAK D VEL: 0.38 M/S
PV PEAK S VEL: 0.44 M/S
RA MAJOR: 4.27 CM
RA PRESSURE ESTIMATED: 3 MMHG
RA WIDTH: 3.25 CM
RIGHT VENTRICLE DIASTOLIC BASEL DIMENSION: 3.2 CM
RV TB RVSP: 6 MMHG
SINUS: 2.86 CM
STJ: 2.82 CM
TDI LATERAL: 0.13 M/S
TDI SEPTAL: 0.07 M/S
TDI: 0.1 M/S
TR MAX PG: 32 MMHG
TRICUSPID ANNULAR PLANE SYSTOLIC EXCURSION: 1.68 CM
TV REST PULMONARY ARTERY PRESSURE: 35 MMHG
Z-SCORE OF LEFT VENTRICULAR DIMENSION IN END DIASTOLE: 1.46
Z-SCORE OF LEFT VENTRICULAR DIMENSION IN END SYSTOLE: 1.09

## 2024-09-09 PROCEDURE — 93306 TTE W/DOPPLER COMPLETE: CPT | Mod: 26,,, | Performed by: INTERNAL MEDICINE

## 2024-09-09 PROCEDURE — 93306 TTE W/DOPPLER COMPLETE: CPT

## 2024-09-09 PROCEDURE — 93356 MYOCRD STRAIN IMG SPCKL TRCK: CPT | Mod: ,,, | Performed by: INTERNAL MEDICINE

## 2024-09-09 PROCEDURE — 93356 MYOCRD STRAIN IMG SPCKL TRCK: CPT

## 2024-09-12 DIAGNOSIS — E78.00 PURE HYPERCHOLESTEROLEMIA: ICD-10-CM

## 2024-09-12 RX ORDER — ATORVASTATIN CALCIUM 20 MG/1
TABLET, FILM COATED ORAL
Qty: 90 TABLET | Refills: 3 | Status: SHIPPED | OUTPATIENT
Start: 2024-09-12

## 2024-09-16 ENCOUNTER — OFFICE VISIT (OUTPATIENT)
Dept: CARDIOLOGY | Facility: CLINIC | Age: 69
End: 2024-09-16
Payer: COMMERCIAL

## 2024-09-16 VITALS
WEIGHT: 155.63 LBS | HEART RATE: 65 BPM | SYSTOLIC BLOOD PRESSURE: 124 MMHG | BODY MASS INDEX: 29.41 KG/M2 | DIASTOLIC BLOOD PRESSURE: 73 MMHG

## 2024-09-16 DIAGNOSIS — I42.0 DILATED CARDIOMYOPATHY: Primary | ICD-10-CM

## 2024-09-16 DIAGNOSIS — G47.33 OSA (OBSTRUCTIVE SLEEP APNEA): ICD-10-CM

## 2024-09-16 DIAGNOSIS — R00.1 ASYMPTOMATIC BRADYCARDIA: ICD-10-CM

## 2024-09-16 DIAGNOSIS — E78.00 PURE HYPERCHOLESTEROLEMIA: Chronic | ICD-10-CM

## 2024-09-16 DIAGNOSIS — I49.1 PREMATURE ATRIAL CONTRACTIONS: Chronic | ICD-10-CM

## 2024-09-16 PROCEDURE — 3074F SYST BP LT 130 MM HG: CPT | Mod: CPTII,S$GLB,, | Performed by: INTERNAL MEDICINE

## 2024-09-16 PROCEDURE — 99999 PR PBB SHADOW E&M-EST. PATIENT-LVL III: CPT | Mod: PBBFAC,,, | Performed by: INTERNAL MEDICINE

## 2024-09-16 PROCEDURE — 99213 OFFICE O/P EST LOW 20 MIN: CPT | Mod: S$GLB,,, | Performed by: INTERNAL MEDICINE

## 2024-09-16 PROCEDURE — 1159F MED LIST DOCD IN RCRD: CPT | Mod: CPTII,S$GLB,, | Performed by: INTERNAL MEDICINE

## 2024-09-16 PROCEDURE — 1101F PT FALLS ASSESS-DOCD LE1/YR: CPT | Mod: CPTII,S$GLB,, | Performed by: INTERNAL MEDICINE

## 2024-09-16 PROCEDURE — 3008F BODY MASS INDEX DOCD: CPT | Mod: CPTII,S$GLB,, | Performed by: INTERNAL MEDICINE

## 2024-09-16 PROCEDURE — 3288F FALL RISK ASSESSMENT DOCD: CPT | Mod: CPTII,S$GLB,, | Performed by: INTERNAL MEDICINE

## 2024-09-16 PROCEDURE — 1126F AMNT PAIN NOTED NONE PRSNT: CPT | Mod: CPTII,S$GLB,, | Performed by: INTERNAL MEDICINE

## 2024-09-16 PROCEDURE — 3078F DIAST BP <80 MM HG: CPT | Mod: CPTII,S$GLB,, | Performed by: INTERNAL MEDICINE

## 2024-09-16 NOTE — PROGRESS NOTES
Subjective:   2024     Patient ID:  Teresa Dennis is a 69 y.o. female who presents for evaulation of No chief complaint on file.        Patient comes in for follow-up, no problems after cardiac catheterization, no chest pains or tightness, no PND or orthopnea.  Underwent a PAC ablation, seems to have worked quite well.  Repeat echocardiography recently shows continued mildly decreased systolic function.  Prior cardiac catheterization did not show significant coronary disease.      She does have sleep apnea, currently trying to tolerate CPAP.  Will follow-up with sleep medicine.    Blood pressure appears to be well controlled, not on meds.          Prior note reviewed:  Patient did develop increasing shortness of breath recently, no chest pains or tightness.  Had a stress echo showing decreased left ventricular systolic function at baseline without normal augmentation to hyperdynamic function.  A exercise treadmill test showed bradycardia at rest due to blocked PACs.  She was able to increase her heart rate into normal ranges with exercise.    She does have PACs/PVCs and dyslipidemia.  Did episode symptomatic bradycardia several weeks ago, Holter monitor showed very frequent PACs and PVCs.  She continues to be symptomatic and was seen by EP, felt that she was having symptomatic bradycardia, permanent pacemaker was recommended, she is feeling better now and, will follow-up after another Holter with EP.    She does have positive family history for congestive heart failure.  Hypercholesterolemia is treated with moderate dose statin therapy.  Lipid profile in  shows total cholesterol 188, HDL 57, , triglycerides 79.     Her father had AF and  of CHF.     She only smoked in high school.    Subsequent stress echo:     ·  Left Ventricle: Mild global hypokinesis present. There is mildly reduced systolic function with a visually estimated ejection fraction of 45 - 50%.  ·  Right Ventricle: Normal right  ventricular cavity size. Wall thickness is normal. Right ventricle wall motion  is normal. Systolic function is normal.  ·  Tricuspid Valve: There is mild regurgitation.  ·  Pulmonary Artery: There is mild pulmonary hypertension. The estimated pulmonary artery systolic pressure is 43 mmHg.  ·  IVC/SVC: Normal venous pressure at 3 mmHg.  ·  Stress Protocol: The patient exercised for 11 minutes 30 seconds on a medium ramp protocol, corresponding to a functional capacity of 7 METS, achieving a peak heart rate of 134 bpm, which is 91 % of the age predicted maximum heart rate. Their exercise capacity was above average. The patient reported no symptoms during the stress test. The test was stopped because the end of the protocol was reached.  ·  Baseline ECG: The Baseline ECG reveals sinus rhythm. The axis is normal. The ST segments are normal.  ·  Stress ECG: There are no ST segment deviation identified during the protocol. During stress, frequent PACs are noted. During stress, frequent PVCs are noted. There is normal blood pressure response with stress.  ·  ECG Conclusion: The ECG portion of the study is negative for ischemia.  ·  Post-stress Echo: The left ventricle systolic function is normal.  ·  Post-stress Impression: The study is negative with no echocardiographic evidence of stress induced ischemia.       Abnormal stress echocardiogram, left ventricular systolic function at rest is mildly decreased.  This does appear to normalized with exercise, exercise capacity is above average.     Frequent PACs and PVCs are present throughout the study.    Cardiac catheterization:  ·  The estimated blood loss was <50 mL.  ·  The coronary arteries were normal..     Patient with abnormal stress echocardiogram, very frequent PACs and PVCs.  Cardiac catheterization today though shows normal coronary arteries.  She should follow-up with EP, does have fairly severe bradycardia.  This may be related to blocked PACs as noted on her  stress test.                    Past Medical History:   Diagnosis Date    Premature atrial contractions     PVC (premature ventricular contraction)     Seizure disorder        Review of patient's allergies indicates:  No Known Allergies      Current Outpatient Medications:     atorvastatin (LIPITOR) 20 MG tablet, TAKE 1 TABLET BY MOUTH EVERY DAY, Disp: 90 tablet, Rfl: 3    carBAMazepine (CARBATROL) 300 MG CM12, TAKE 1 CAPSULE BY MOUTH TWICE A DAY (Patient taking differently: Take 300 mg by mouth once daily.), Disp: 180 capsule, Rfl: 1    ergocalciferol (ERGOCALCIFEROL) 50,000 unit Cap, Take 1 capsule by mouth once a week., Disp: , Rfl:     risedronate (ACTONEL) 35 MG tablet, Take 35 mg by mouth every 7 days., Disp: , Rfl:     famotidine (PEPCID AC ORAL), Take 1 tablet by mouth daily as needed (Heartburn)., Disp: , Rfl:      Objective:   Review of Systems   Cardiovascular:  Negative for chest pain, claudication, cyanosis, dyspnea on exertion, irregular heartbeat, leg swelling, near-syncope, orthopnea, palpitations, paroxysmal nocturnal dyspnea and syncope.       Vitals:    09/16/24 1333   BP: 124/73   Pulse: 65         Physical Exam  Vitals reviewed.   Constitutional:       General: She is not in acute distress.     Appearance: She is well-developed.   HENT:      Head: Normocephalic and atraumatic.      Nose: Nose normal.   Eyes:      Conjunctiva/sclera: Conjunctivae normal.      Pupils: Pupils are equal, round, and reactive to light.   Neck:      Vascular: No JVD.   Cardiovascular:      Rate and Rhythm: Normal rate. Rhythm irregular.      Pulses: Intact distal pulses.      Heart sounds: Normal heart sounds. No murmur heard.     No friction rub. No gallop.   Pulmonary:      Effort: Pulmonary effort is normal. No respiratory distress.      Breath sounds: Normal breath sounds. No wheezing or rales.   Chest:      Chest wall: No tenderness.   Abdominal:      General: Bowel sounds are normal. There is no distension.       Palpations: Abdomen is soft.      Tenderness: There is no abdominal tenderness.   Musculoskeletal:         General: No tenderness or deformity. Normal range of motion.      Cervical back: Normal range of motion and neck supple.   Skin:     General: Skin is warm and dry.      Findings: No erythema or rash.   Neurological:      Mental Status: She is alert and oriented to person, place, and time.      Cranial Nerves: No cranial nerve deficit.      Motor: No abnormal muscle tone.      Coordination: Coordination normal.   Psychiatric:         Behavior: Behavior normal.         Thought Content: Thought content normal.         Judgment: Judgment normal.                       Assessment and Plan:     Dilated cardiomyopathy  Comments:  Ejection fraction stable, recheck again in 6 months.  Orders:  -     Echo; Future; Expected date: 03/16/2025    Asymptomatic bradycardia    Premature atrial contractions  Comments:  Resolved after ablation    Pure hypercholesterolemia  Comments:  On moderate dose statin therapy    TAYLOR (obstructive sleep apnea)  Comments:  Will follow-up with sleep Medicine, having problems tolerating CPAP                Follow up in about 6 months (around 3/16/2025).

## 2024-09-18 ENCOUNTER — HOSPITAL ENCOUNTER (EMERGENCY)
Facility: HOSPITAL | Age: 69
Discharge: HOME | End: 2024-09-18
Attending: STUDENT IN AN ORGANIZED HEALTH CARE EDUCATION/TRAINING PROGRAM
Payer: COMMERCIAL

## 2024-09-18 ENCOUNTER — PATIENT MESSAGE (OUTPATIENT)
Dept: CARDIOLOGY | Facility: CLINIC | Age: 69
End: 2024-09-18
Payer: COMMERCIAL

## 2024-09-18 VITALS
BODY MASS INDEX: 28.32 KG/M2 | WEIGHT: 150 LBS | SYSTOLIC BLOOD PRESSURE: 144 MMHG | HEIGHT: 61 IN | TEMPERATURE: 98 F | DIASTOLIC BLOOD PRESSURE: 67 MMHG | HEART RATE: 65 BPM | RESPIRATION RATE: 18 BRPM | OXYGEN SATURATION: 98 %

## 2024-09-18 DIAGNOSIS — R00.2 PALPITATIONS: ICD-10-CM

## 2024-09-18 DIAGNOSIS — I48.91 ATRIAL FIBRILLATION: ICD-10-CM

## 2024-09-18 PROBLEM — I48.0 PAF (PAROXYSMAL ATRIAL FIBRILLATION): Status: ACTIVE | Noted: 2024-09-18

## 2024-09-18 LAB
ALBUMIN SERPL BCP-MCNC: 3.8 G/DL (ref 3.5–5.2)
ALP SERPL-CCNC: 99 U/L (ref 55–135)
ALT SERPL W/O P-5'-P-CCNC: 19 U/L (ref 10–44)
ANION GAP SERPL CALC-SCNC: 6 MMOL/L (ref 8–16)
AST SERPL-CCNC: 19 U/L (ref 10–40)
BACTERIA #/AREA URNS AUTO: NORMAL /HPF
BASOPHILS # BLD AUTO: 0.04 K/UL (ref 0–0.2)
BASOPHILS NFR BLD: 0.7 % (ref 0–1.9)
BILIRUB SERPL-MCNC: 0.3 MG/DL (ref 0.1–1)
BILIRUB UR QL STRIP: NEGATIVE
BNP SERPL-MCNC: 97 PG/ML (ref 0–99)
BUN SERPL-MCNC: 16 MG/DL (ref 8–23)
CALCIUM SERPL-MCNC: 9.4 MG/DL (ref 8.7–10.5)
CHLORIDE SERPL-SCNC: 107 MMOL/L (ref 95–110)
CLARITY UR REFRACT.AUTO: CLEAR
CO2 SERPL-SCNC: 25 MMOL/L (ref 23–29)
COLOR UR AUTO: COLORLESS
CREAT SERPL-MCNC: 0.8 MG/DL (ref 0.5–1.4)
DIFFERENTIAL METHOD BLD: ABNORMAL
EOSINOPHIL # BLD AUTO: 0.1 K/UL (ref 0–0.5)
EOSINOPHIL NFR BLD: 2.1 % (ref 0–8)
ERYTHROCYTE [DISTWIDTH] IN BLOOD BY AUTOMATED COUNT: 12.5 % (ref 11.5–14.5)
EST. GFR  (NO RACE VARIABLE): >60 ML/MIN/1.73 M^2
GLUCOSE SERPL-MCNC: 96 MG/DL (ref 70–110)
GLUCOSE UR QL STRIP: NEGATIVE
HCT VFR BLD AUTO: 38.4 % (ref 37–48.5)
HCV AB SERPL QL IA: NORMAL
HGB BLD-MCNC: 12.7 G/DL (ref 12–16)
HGB UR QL STRIP: NEGATIVE
HIV 1+2 AB+HIV1 P24 AG SERPL QL IA: NORMAL
IMM GRANULOCYTES # BLD AUTO: 0 K/UL (ref 0–0.04)
IMM GRANULOCYTES NFR BLD AUTO: 0 % (ref 0–0.5)
KETONES UR QL STRIP: NEGATIVE
LEUKOCYTE ESTERASE UR QL STRIP: ABNORMAL
LYMPHOCYTES # BLD AUTO: 1.7 K/UL (ref 1–4.8)
LYMPHOCYTES NFR BLD: 28.7 % (ref 18–48)
MAGNESIUM SERPL-MCNC: 2 MG/DL (ref 1.6–2.6)
MCH RBC QN AUTO: 32.6 PG (ref 27–31)
MCHC RBC AUTO-ENTMCNC: 33.1 G/DL (ref 32–36)
MCV RBC AUTO: 99 FL (ref 82–98)
MICROSCOPIC COMMENT: NORMAL
MONOCYTES # BLD AUTO: 0.7 K/UL (ref 0.3–1)
MONOCYTES NFR BLD: 12.4 % (ref 4–15)
NEUTROPHILS # BLD AUTO: 3.3 K/UL (ref 1.8–7.7)
NEUTROPHILS NFR BLD: 56.1 % (ref 38–73)
NITRITE UR QL STRIP: NEGATIVE
NRBC BLD-RTO: 0 /100 WBC
OHS QRS DURATION: 86 MS
OHS QRS DURATION: 92 MS
OHS QRS DURATION: 92 MS
OHS QTC CALCULATION: 419 MS
OHS QTC CALCULATION: 427 MS
OHS QTC CALCULATION: 481 MS
PH UR STRIP: 7 [PH] (ref 5–8)
PLATELET # BLD AUTO: 246 K/UL (ref 150–450)
PMV BLD AUTO: 10.1 FL (ref 9.2–12.9)
POTASSIUM SERPL-SCNC: 4.1 MMOL/L (ref 3.5–5.1)
PROT SERPL-MCNC: 7.1 G/DL (ref 6–8.4)
PROT UR QL STRIP: NEGATIVE
RBC # BLD AUTO: 3.89 M/UL (ref 4–5.4)
RBC #/AREA URNS AUTO: 0 /HPF (ref 0–4)
SODIUM SERPL-SCNC: 138 MMOL/L (ref 136–145)
SP GR UR STRIP: 1.01 (ref 1–1.03)
T4 FREE SERPL-MCNC: 0.83 NG/DL (ref 0.71–1.51)
TROPONIN I SERPL DL<=0.01 NG/ML-MCNC: <0.006 NG/ML (ref 0–0.03)
TSH SERPL DL<=0.005 MIU/L-ACNC: 0.39 UIU/ML (ref 0.4–4)
URN SPEC COLLECT METH UR: ABNORMAL
WBC # BLD AUTO: 5.81 K/UL (ref 3.9–12.7)
WBC #/AREA URNS AUTO: 2 /HPF (ref 0–5)

## 2024-09-18 PROCEDURE — 99285 EMERGENCY DEPT VISIT HI MDM: CPT | Mod: 25

## 2024-09-18 PROCEDURE — 93010 ELECTROCARDIOGRAM REPORT: CPT | Mod: ,,, | Performed by: INTERNAL MEDICINE

## 2024-09-18 PROCEDURE — 84439 ASSAY OF FREE THYROXINE: CPT | Performed by: EMERGENCY MEDICINE

## 2024-09-18 PROCEDURE — 25000003 PHARM REV CODE 250

## 2024-09-18 PROCEDURE — 93005 ELECTROCARDIOGRAM TRACING: CPT

## 2024-09-18 PROCEDURE — 96360 HYDRATION IV INFUSION INIT: CPT

## 2024-09-18 PROCEDURE — 81001 URINALYSIS AUTO W/SCOPE: CPT | Performed by: EMERGENCY MEDICINE

## 2024-09-18 PROCEDURE — 87389 HIV-1 AG W/HIV-1&-2 AB AG IA: CPT | Performed by: PHYSICIAN ASSISTANT

## 2024-09-18 PROCEDURE — 83880 ASSAY OF NATRIURETIC PEPTIDE: CPT | Performed by: PHYSICIAN ASSISTANT

## 2024-09-18 PROCEDURE — 86803 HEPATITIS C AB TEST: CPT | Performed by: PHYSICIAN ASSISTANT

## 2024-09-18 PROCEDURE — 83735 ASSAY OF MAGNESIUM: CPT | Performed by: EMERGENCY MEDICINE

## 2024-09-18 PROCEDURE — 84484 ASSAY OF TROPONIN QUANT: CPT | Performed by: EMERGENCY MEDICINE

## 2024-09-18 PROCEDURE — 85025 COMPLETE CBC W/AUTO DIFF WBC: CPT | Performed by: EMERGENCY MEDICINE

## 2024-09-18 PROCEDURE — 84443 ASSAY THYROID STIM HORMONE: CPT | Performed by: EMERGENCY MEDICINE

## 2024-09-18 PROCEDURE — 80053 COMPREHEN METABOLIC PANEL: CPT | Performed by: EMERGENCY MEDICINE

## 2024-09-18 RX ORDER — METOPROLOL SUCCINATE 25 MG/1
12.5 TABLET, EXTENDED RELEASE ORAL DAILY
Qty: 45 TABLET | Refills: 3 | Status: SHIPPED | OUTPATIENT
Start: 2024-09-18 | End: 2025-09-18

## 2024-09-18 RX ADMIN — SODIUM CHLORIDE 500 ML: 9 INJECTION, SOLUTION INTRAVENOUS at 01:09

## 2024-09-18 NOTE — CONSULTS
[facility] - Emergency Dept  Cardiology  Consult Note    Patient Name: Teresa Dennis  MRN: [id number]  Admission Date: 9/18/2024  Hospital Length of Stay: 0 days  Code Status: Prior   Attending Provider: No att. providers found   Consulting Provider: Albina Alicea MD  Primary Care Physician: Ashwini Culver MD  Principal Problem:<principal problem not specified>    Patient information was obtained from patient and ER records.     Inpatient consult to Cardiology  Consult performed by: Albina Oliva MD  Consult ordered by: Abram Bonner MD  Assessment/Recommendations: I have personally taken the history and examined the patient and agree with the resident's note as stated above.PAF converted . Needs low dose BB 12.5 of Metoprolol XL to start and Eliquis 5 BID.        Subjective:     Chief Complaint:  tachycardia     HPI:   Mr. Dennis is a 68 y/o W with PMH frequent PACs s/p ablation on 7/16/2024 by Dr. Verma, bradycardia possible SSS, mildly reduced systolic function no HF exacerbation. seizure disorder who presented to the ED with a c/o tachycardia. Per patient at 1am while sleeping her watch alerted her that her HR was in the 130's and continued until 5am intermittently showing HRs in the 115-140's. She proceeded to go to work and admitted just feeling like she was extra caffeinated. She came in the ER as suggested by her employer. Admit perhaps once a month feeling some palpitations that go away on its own after a few seconds. She has been off metoprolol > 1 yr due to bradycardia. On arrival to the ED ECG revealed rate controlled AF. She subsequently converted on her own to SR.       TTE 9/9/2024    Left Ventricle: The left ventricle is mildly dilated. Normal wall thickness. There is mildly reduced systolic function with a visually estimated ejection fraction of 45 - 50%. Global longitudinal strain is -17.0%. There is normal diastolic function.    Left Atrium: Left atrium is mildly  dilated.    Past Medical History:   Diagnosis Date    Premature atrial contractions     PVC (premature ventricular contraction)     Seizure disorder        Past Surgical History:   Procedure Laterality Date    ABLATION N/A 2024    Procedure: Ablation;  Surgeon: Jose Verma MD;  Location: [facility] EP LAB;  Service: Cardiology;  Laterality: N/A;  PAC, RFA, Carto, MAC, GP, 3 prep     SECTION      CORONARY ANGIOGRAPHY N/A 3/8/2024    Procedure: ANGIOGRAM, CORONARY ARTERY;  Surgeon: Leopoldo Comer Jr., MD;  Location: [facility] CATH LAB;  Service: Cardiology;  Laterality: N/A;    ENDOMETRIAL ABLATION      HERNIA REPAIR      Tonsillectomy         Review of patient's allergies indicates:  No Known Allergies    No current facility-administered medications on file prior to encounter.     Current Outpatient Medications on File Prior to Encounter   Medication Sig    atorvastatin (LIPITOR) 20 MG tablet TAKE 1 TABLET BY MOUTH EVERY DAY    carBAMazepine (CARBATROL) 300 MG CM12 TAKE 1 CAPSULE BY MOUTH TWICE A DAY (Patient taking differently: Take 300 mg by mouth once daily.)    ergocalciferol (ERGOCALCIFEROL) 50,000 unit Cap Take 1 capsule by mouth once a week.    famotidine (PEPCID AC ORAL) Take 1 tablet by mouth daily as needed (Heartburn).    risedronate (ACTONEL) 35 MG tablet Take 35 mg by mouth every 7 days.     Family History    None       Tobacco Use    Smoking status: Never    Smokeless tobacco: Never   Substance and Sexual Activity    Alcohol use: Yes     Comment: Socially    Drug use: No    Sexual activity: Yes     Partners: Male     Comment:      Review of Systems   All other systems reviewed and are negative.    Objective:     Vital Signs (Most Recent):  Temp: 97.6 °F (36.4 °C) (24 1700)  Pulse: 65 (24 1700)  Resp: 18 (24 1700)  BP: (!) 144/67 (24 1700)  SpO2: 98 % (24 1700) Vital Signs (24h Range):  Temp:  [97.6 °F (36.4 °C)-98.4 °F (36.9  °C)] 97.6 °F (36.4 °C)  Pulse:  [] 65  Resp:  [18-20] 18  SpO2:  [96 %-98 %] 98 %  BP: (131-145)/(67-97) 144/67     Weight: 68 kg (150 lb)  Body mass index is 28.34 kg/m².    SpO2: 98 %         Intake/Output Summary (Last 24 hours) at 9/18/2024 1704  Last data filed at 9/18/2024 1443  Gross per 24 hour   Intake 500 ml   Output --   Net 500 ml       Lines/Drains/Airways       None                    Physical Exam  Vitals and nursing note reviewed.   Constitutional:       General: She is not in acute distress.     Appearance: Normal appearance. She is not ill-appearing.   HENT:      Head: Normocephalic and atraumatic.      Nose: Nose normal.      Mouth/Throat:      Mouth: Mucous membranes are moist.      Pharynx: Oropharynx is clear.   Eyes:      Extraocular Movements: Extraocular movements intact.      Conjunctiva/sclera: Conjunctivae normal.   Cardiovascular:      Rate and Rhythm: Normal rate and regular rhythm.      Pulses: Normal pulses.      Heart sounds: No murmur heard.     No friction rub. No gallop.   Pulmonary:      Effort: Pulmonary effort is normal.      Breath sounds: Normal breath sounds.   Abdominal:      General: Abdomen is flat.      Palpations: Abdomen is soft.      Tenderness: There is no abdominal tenderness.   Musculoskeletal:         General: No swelling or tenderness. Normal range of motion.      Cervical back: Normal range of motion and neck supple.   Skin:     General: Skin is warm and dry.      Capillary Refill: Capillary refill takes less than 2 seconds.   Neurological:      General: No focal deficit present.      Mental Status: She is alert and oriented to person, place, and time.      Motor: No weakness.   Psychiatric:         Mood and Affect: Mood normal.         Thought Content: Thought content normal.          Significant Labs:   Recent Lab Results  (Last 5 results in the past 24 hours)        09/18/24  1534   09/18/24  1533   09/18/24  1223   09/18/24  1211   09/18/24  1148         Albumin       3.8         ALP       99         ALT       19         Anion Gap       6         Appearance, UA     Clear           AST       19         Bacteria, UA     Rare           Baso #       0.04         Basophil %       0.7         Bilirubin (UA)     Negative           BILIRUBIN TOTAL       0.3  Comment: For infants and newborns, interpretation of results should be based  on gestational age, weight and in agreement with clinical  observations.    Premature Infant recommended reference ranges:  Up to 24 hours.............<8.0 mg/dL  Up to 48 hours............<12.0 mg/dL  3-5 days..................<15.0 mg/dL  6-29 days.................<15.0 mg/dL           BNP       97  Comment: Values of less than 100 pg/ml are consistent with non-CHF populations.         BUN       16         Calcium       9.4         Chloride       107         CO2       25         Color, UA     Colorless           Creatinine       0.8         Differential Method       Automated         eGFR       >60.0         Eos #       0.1         Eos %       2.1         Free T4       0.83         Glucose       96         Glucose, UA     Negative           Gran # (ANC)       3.3         Gran %       56.1         Hematocrit       38.4         Hemoglobin       12.7         Hepatitis C Ab       Non-reactive         HIV 1/2 Ag/Ab       Non-reactive         Immature Grans (Abs)       0.00  Comment: Mild elevation in immature granulocytes is non specific and   can be seen in a variety of conditions including stress response,   acute inflammation, trauma and pregnancy. Correlation with other   laboratory and clinical findings is essential.           Immature Granulocytes       0.0         Ketones, UA     Negative           Leukocyte Esterase, UA     1+           Lymph #       1.7         Lymph %       28.7         Magnesium        2.0         MCH       32.6         MCHC       33.1         MCV       99         Microscopic Comment     SEE COMMENT  Comment: Other  formed elements not mentioned in the report are not   present in the microscopic examination.              Mono #       0.7         Mono %       12.4         MPV       10.1         NITRITE UA     Negative           nRBC       0         Blood, UA     Negative           QRS Duration 92   92       86       [facility] QTC Calculation 427   419       481       pH, UA     7.0           Platelet Count       246         Potassium       4.1         PROTEIN TOTAL       7.1         Protein, UA     Negative  Comment: Recommend a 24 hour urine protein or a urine   protein/creatinine ratio if globulin induced proteinuria is  clinically suspected.             RBC       3.89         RBC, UA     0           RDW       12.5         Sodium       138         Spec Grav UA     1.010           Specimen UA     Urine, Clean Catch           Troponin I       <0.006  Comment: The reference interval for Troponin I represents the 99th percentile   cutoff   for our facility and is consistent with 3rd generation assay   performance.           TSH       0.388         WBC, UA     2           WBC       5.81                                Assessment and Plan:     PAF (paroxysmal atrial fibrillation)  Patient with Paroxysmal (<7 days) atrial fibrillation with history of multiple PACs s/p ablation and mildly reduced systolic function no HF exacerbation..[id number] Score: 3. HASBLED Score: 1.     Recs:  - Patient converted to SR w/o intervention  - Ok to start patient on low dose toprol 12.5mg due to previous hx of bradycardia and recommend starting anticoagulation with eliquis 5mg BID.   - Patient can follow up with Dr. Verma as outpatient.   - Discussed with EP fellow and Dr. Jimenez.         VTE Risk Mitigation (From admission, onward)      None            Thank you for your consult. I will sign off. Please contact us if you have any additional questions.    Albina Alicea MD  Cardiology   [facility] - Emergency Dept

## 2024-09-18 NOTE — HPI
Mr. Dennis is a 70 y/o W with PMH frequent PACs s/p ablation on 7/16/2024 by Dr. Verma, bradycardia possible SSS, mildly reduced systolic function no HF exacerbation. seizure disorder who presented to the ED with a c/o tachycardia. Per patient at 1am while sleeping her watch alerted her that her HR was in the 130's and continued until 5am intermittently showing HRs in the 115-140's. She proceeded to go to work and admitted just feeling like she was extra caffeinated. She came in the ER as suggested by her employer. Admit perhaps once a month feeling some palpitations that go away on its own after a few seconds. She has been off metoprolol > 1 yr due to bradycardia. On arrival to the ED ECG revealed rate controlled AF. She subsequently converted on her own to SR.       TTE 9/9/2024    Left Ventricle: The left ventricle is mildly dilated. Normal wall thickness. There is mildly reduced systolic function with a visually estimated ejection fraction of 45 - 50%. Global longitudinal strain is -17.0%. There is normal diastolic function.    Left Atrium: Left atrium is mildly dilated.

## 2024-09-18 NOTE — ED PROVIDER NOTES
"Encounter Date: 2024       History     Chief Complaint   Patient presents with    Tachycardia     "According to my watch I'm in A fib" "I feel like a drank a lot of coffee and I haven't" mild sob no chest pain reports hr in 130s. No hx of a fib     The history is provided by the patient, the spouse and medical records. No  was used.     Teresa Dennis is a 69 y.o. F with a PMHx of seizures on carbamazepine and PAC ablation presenting for tachycardia. Patient report she woke up this morning to her watch alerting her that she was in atrial fibrillation. She reports of palpitations upon waking. No history of A fib. She is asymptomatic. Patient denies headache, dizziness, light-headedness, fever, chills, shortness of breath, cough, chest pain, abdominal pain, N/V/D, urinary changes, LE pain/swelling. She reports last night she drank two vodka sodas and half of an Turkmen coffee. Otherwise, no drug use. Patient denies any sick contacts. She has been eating and drinking normally.     Review of patient's allergies indicates:  No Known Allergies  Past Medical History:   Diagnosis Date    Premature atrial contractions     PVC (premature ventricular contraction)     Seizure disorder      Past Surgical History:   Procedure Laterality Date    ABLATION N/A 2024    Procedure: Ablation;  Surgeon: Jose Verma MD;  Location: [facility] EP LAB;  Service: Cardiology;  Laterality: N/A;  PAC, RFA, Carto, MAC, GP, 3 prep     SECTION      CORONARY ANGIOGRAPHY N/A 3/8/2024    Procedure: ANGIOGRAM, CORONARY ARTERY;  Surgeon: Leopoldo Comer Jr., MD;  Location: [facility] CATH LAB;  Service: Cardiology;  Laterality: N/A;    ENDOMETRIAL ABLATION      HERNIA REPAIR      Tonsillectomy       No family history on file.  Social History     Tobacco Use    Smoking status: Never    Smokeless tobacco: Never   Substance Use Topics    Alcohol use: Yes     Comment: Socially    Drug use: No     Review of " Systems    Physical Exam     Initial Vitals [09/18/24 1138]   BP Pulse Resp Temp SpO2   (!) 143/73 90 19 98.1 °F (36.7 °C) 97 %      MAP       --         Physical Exam    Constitutional: She appears well-developed and well-nourished. She is not diaphoretic. No distress.   HENT:   Head: Normocephalic and atraumatic.   Eyes: EOM are normal.   Neck:   Normal range of motion.  Cardiovascular:  Normal heart sounds. An irregularly irregular rhythm present.   Tachycardia present.         Pulses:       Radial pulses are 2+ on the right side and 2+ on the left side.   Pulmonary/Chest: Breath sounds normal. No respiratory distress. She has no wheezes. She has no rales.   Abdominal: Abdomen is soft. She exhibits no distension. There is no abdominal tenderness.   Musculoskeletal:         General: No tenderness or edema. Normal range of motion.      Cervical back: Normal range of motion.     Neurological: She is alert and oriented to person, place, and time. GCS score is 15. GCS eye subscore is 4. GCS verbal subscore is 5. GCS motor subscore is 6.   Skin: Skin is warm and dry. Capillary refill takes less than 2 seconds.   Psychiatric: She has a normal mood and affect.         ED Course   Procedures  Labs Reviewed   CBC W/ AUTO DIFFERENTIAL - Abnormal       Result Value    WBC 5.81      RBC 3.89 (*)     Hemoglobin 12.7      Hematocrit 38.4      MCV 99 (*)     MCH 32.6 (*)     MCHC 33.1      RDW 12.5      Platelets 246      MPV 10.1      Immature Granulocytes 0.0      Gran # (ANC) 3.3      Immature Grans (Abs) 0.00      Lymph # 1.7      Mono # 0.7      Eos # 0.1      Baso # 0.04      nRBC 0      Gran % 56.1      Lymph % 28.7      Mono % 12.4      Eosinophil % 2.1      Basophil % 0.7      Differential Method Automated      Narrative:     Release to patient->Immediate   COMPREHENSIVE METABOLIC PANEL - Abnormal    Sodium 138      Potassium 4.1      Chloride 107      CO2 25      Glucose 96      BUN 16      Creatinine 0.8      Calcium  9.4      Total Protein 7.1      Albumin 3.8      Total Bilirubin 0.3      Alkaline Phosphatase 99      AST 19      ALT 19      eGFR >60.0      Anion Gap 6 (*)     Narrative:     Release to patient->Immediate   TSH - Abnormal    TSH 0.388 (*)     Narrative:     Release to patient->Immediate    add on Troponin-[id number] per Sara Lerma MD  09/18/2024  13:01   [facility]   URINALYSIS, REFLEX TO URINE CULTURE - Abnormal    Specimen UA Urine, Clean Catch      Color, UA Colorless (*)     Appearance, UA Clear      pH, UA 7.0      Specific Gravity, UA 1.010      Protein, UA Negative      Glucose, UA Negative      Ketones, UA Negative      Bilirubin (UA) Negative      Occult Blood UA Negative      Nitrite, UA Negative      Leukocytes, UA 1+ (*)     Narrative:     Specimen Source->Urine   HIV 1 / 2 ANTIBODY    HIV 1/2 Ag/Ab Non-reactive      Narrative:     Release to patient->Immediate    add on Troponin-[id number] per Sara Lerma MD  09/18/2024  13:01   [facility]   HEPATITIS C ANTIBODY    Hepatitis C Ab Non-reactive      Narrative:     Release to patient->Immediate    add on Troponin-[id number] per Sara Lerma MD  09/18/2024  13:01   [facility]   MAGNESIUM    Magnesium 2.0      Narrative:     Release to patient->Immediate   URINALYSIS MICROSCOPIC    RBC, UA 0      WBC, UA 2      Bacteria Rare      Microscopic Comment SEE COMMENT      Narrative:     Specimen Source->Urine   TROPONIN I   B-TYPE NATRIURETIC PEPTIDE   TROPONIN I    Troponin I <0.006      Narrative:     Release to patient->Immediate    add on Troponin-[id number] per Sara Lerma MD  09/18/2024  13:01   [facility]   T4, FREE    Free T4 0.83      Narrative:     Release to patient->Immediate    add on Troponin-[id number] per Sara Lerma MD  09/18/2024  13:01   [facility]   B-TYPE NATRIURETIC PEPTIDE    BNP 97      Narrative:     Release to patient->Immediate    add on Troponin-[id number] per Sara Lerma MD  09/18/2024  13:01   [facility]        ECG  Results              EKG 12-lead (Final result)        Collection Time Result Time QRS Duration OHS QTC Calculation    09/18/24 15:34:57 09/18/24 15:44:07 92 427                     Final result by Interface, Lab In [facility] (09/18/24 15:44:13)                   Narrative:    Test Reason :     Vent. Rate : 066 BPM     Atrial Rate : 066 BPM     P-R Int : 170 ms          QRS Dur : 092 ms      QT Int : 408 ms       P-R-T Axes : 061 016 061 degrees     QTc Int : 427 ms    Normal sinus rhythm  Nonspecific T wave abnormality  Abnormal ECG  When compared with ECG of 18-SEP-2024 15:33,  No significant change was found  Confirmed by TARIK RAYO MD ([id number]) on 9/18/2024 3:44:05 PM    Referred By: [email or web address]   SELF           Confirmed By:TARIK RAYO MD                                     EKG 12-lead (Final result)        Collection Time Result Time QRS Duration OHS QTC Calculation    09/18/24 15:33:52 09/18/24 15:43:57 92 419                     Final result by Interface, Lab In [facility] (09/18/24 15:44:03)                   Narrative:    Test Reason : I48.91,    Vent. Rate : 070 BPM     Atrial Rate : 070 BPM     P-R Int : 160 ms          QRS Dur : 092 ms      QT Int : 388 ms       P-R-T Axes : 067 017 047 degrees     QTc Int : 419 ms    Normal sinus rhythm with sinus arrhythmia  Nonspecific T wave abnormality  Abnormal ECG  When compared with ECG of 18-SEP-2024 11:48,  Sinus rhythm has replaced Atrial fibrillation  Vent. rate has decreased BY  66 BPM  T wave inversion now evident in Anterior leads  Confirmed by TARIK RAYO MD ([id number]) on 9/18/2024 3:43:53 PM    Referred By: [email or web address]   SELF           Confirmed By:TARIK RAYO MD                                     EKG 12-lead (Final result)        Collection Time Result Time QRS Duration OHS QTC Calculation    09/18/24 11:48:12 09/18/24 12:17:16 86 481                     Final result by Interface, Lab In [facility] (09/18/24 12:17:28)                   Narrative:    Test  Reason : R00.2,    Vent. Rate : 136 BPM     Atrial Rate : 000 BPM     P-R Int : 000 ms          QRS Dur : 086 ms      QT Int : 320 ms       P-R-T Axes : 000 008 -68 degrees     QTc Int : 481 ms    Atrial fibrillation with rapid ventricular response  Low septal forces  Abnormal ECG  When compared with ECG of 16-JUL-2024 16:24,  Atrial fibrillation has replaced Sinus rhythm  Vent. rate has increased BY  65 BPM  Septal forces slightly less  Nonspecific T wave abnormality now evident in Lateral leads  Confirmed by Johnnie NEWMAN MD ([id number]) on 9/18/2024 12:17:15 PM    Referred By: [email or web address]   SELF           Confirmed By:Johnnie NEWMAN MD                                  Imaging Results              X-Ray Chest AP Portable (Final result)  Result time 09/18/24 13:14:56      Final result by Cm Temple MD (09/18/24 13:14:56)                   Impression:      See above      Electronically signed by: Cm Temple MD  Date:    09/18/2024  Time:    13:14               Narrative:    EXAMINATION:  XR CHEST AP PORTABLE    CLINICAL HISTORY:  palpitations;    TECHNIQUE:  Single frontal view of the chest was performed.    COMPARISON:  None    FINDINGS:  Cardiac size is normal.  Lungs are clear and well aerated.  No infiltrate is seen.                                       Medications   sodium chloride 0.9% bolus 500 mL 500 mL (0 mLs Intravenous Stopped 9/18/24 1443)     Medical Decision Making  Patient presenting for new onset atrial fibrillation onset this AM. She is s/p ablation for PAC's earlier this year. On arrival, patient was in Afib RVR, otherwise HDS. Patient given bolus of 500ml NS. Currently rate controlled. Reached out to cardiology for further recommendations.    Repeat ECG, NSR. Per cardiology, given patient is back in NSR, recommend toprol 12.5mg once a day with eliquis 5mg BID with EP follow up as outpatient. Return precautions given to the patient.     Amount and/or Complexity of Data Reviewed  Labs: ordered.  Decision-making details documented in ED Course.  Radiology: ordered. Decision-making details documented in ED Course.  ECG/medicine tests:  Decision-making details documented in ED Course.    Risk  Prescription drug management.              Attending Attestation:   Physician Attestation Statement for Resident:  As the supervising MD   Physician Attestation Statement: I have personally seen and examined this patient.   I agree with the above history.  -:   As the supervising MD I agree with the above PE.     As the supervising MD I agree with the above treatment, course, plan, and disposition.   -: See my additional documentation in the ED course                   ED Course as of 09/18/24 1643   Wed Sep 18, 2024   1244 CBC auto differential(!)  No leukocytosis, H/H stable when compared to prior.  [TN]   1256 EKG showed AFib with RVR, rate 136, no STEMI [NN]   1256 Comprehensive metabolic panel(!)  No electrolyte derangements. K is WNL.  [TN]   1256 Magnesium : 2.0  WNL [TN]   1259 Urinalysis Microscopic  No evidence of UTI. [TN]   1315 I evaluated this patient with the resident.  Briefly, patient is a 69-year-old female who presents for palpitations.  She reports that her Apple watch alerted her that she was in atrial fibrillation.  She woke up this morning with some mild palpitations.  She felt like she had drank coffee but had not yet had any caffeine.  She denies any chest pain, shortness breath, leg swelling, leg pain, abdominal pain, nausea or vomiting, fevers, chills, hemoptysis, cough, congestion, sore throat, urinary symptoms. [NN]   1316 Patient had an PAC ablation by cardiology in July of this past year. [NN]   1351 Free T4: 0.83 [NN]   1410 Reached out to cardiology for consultation. They plan to see the patient. Dispo will depend on recommendations. [TN]   1457 BNP  WNL [TN]   1457 Remains HDS, rate controlled. [TN]   1503 Troponin I  Negative   [TN]   1509 X-Ray Chest AP Portable  Cardiac size is  normal.  Lungs are clear and well aerated.  No infiltrate is seen. [TN]   1512 Cardiology evaluation and recommendations pending at time of change over to oncoming staff. I transitioned care to Dr. Bender. [NN]   1633 EKG 12-lead  Repeat ECG shows NSR, no STEMI [TN]      ED Course User Index  [NN] Sara Lerma MD  [TN] Abram Bonner MD                             Clinical Impression:  Final diagnoses:  [R00.2] Palpitations  [I48.91] Atrial fibrillation          ED Disposition Condition    Discharge Stable          ED Prescriptions       Medication Sig Dispense Start Date End Date Auth. Provider    metoprolol succinate (TOPROL-XL) 25 MG 24 hr tablet Take 0.5 tablets (12.5 mg total) by mouth once daily. 45 tablet 9/18/2024 9/18/2025 Abram Bonner MD    apixaban (ELIQUIS) 5 mg Tab Take 1 tablet (5 mg total) by mouth 2 (two) times daily. 60 tablet 9/18/2024 -- Abram Bonner MD          Follow-up Information       Follow up With Specialties Details Why Contact Info    Jose Verma MD Electrophysiology, Cardiovascular Disease, Cardiology Follow up in 1 week(s)  [address] [facility] [address]  [phone]      Ashwini Culver MD Family Medicine   [address]   3rd Floor  [facility] [address]  [phone]      [facility] - Emergency Dept Emergency Medicine  If symptoms worsen [address] [facility] [address]  [phone]             Abram Bonner MD  Resident  09/18/24 1643       Sara Lerma MD  09/18/24 1938

## 2024-09-18 NOTE — ED NOTES
Assumed care of the patient. Pt in hospital gown, side rails up X2, bed low and locked, and call light is placed within reach. Family/visitors at bedside at this time. Pt denies any complaints or needs.

## 2024-09-18 NOTE — FIRST PROVIDER EVALUATION
"Medical screening examination initiated.  I have conducted a focused provider triage encounter, findings are as follows:    Brief history of present illness:  69-year-old female with a history of seizure disorder and history of bradycardia with recent ablation presenting with tachycardia and shortness of breath.  She denies any chest pain but reports feeling mildly short of breath with a feeling of rapid heart rate.  Denies any fevers or chills.  Reports that her alarms told her she was in atrial fibrillation.  No other complaints at this time.    Vitals:    09/18/24 1138   BP: (!) 143/73   BP Location: Right arm   Pulse: 90   Resp: 19   Temp: 98.1 °F (36.7 °C)   TempSrc: Oral   SpO2: 97%   Weight: 68 kg (150 lb)   Height: 5' 1" (1.549 m)       Pertinent physical exam:  Tachycardic heart rate, no chest pain, no focal deficits    Brief workup plan:  Basic labs, ECG, TSH    Preliminary workup initiated; this workup will be continued and followed by the physician or advanced practice provider that is assigned to the patient when roomed.    Anshu Basilio DO, SHAMEKA  Emergency Staff Physician   Dept of Emergency Medicine   [facility]  Spectralink: [id number]        Disclaimer: This note has been generated using voice-recognition software. There may be typographical errors that have been missed during proof-reading.    "

## 2024-09-18 NOTE — ED TRIAGE NOTES
Pt reports ablation for bradycardia in July and states today apple watch alerted her of elevated heart rate. Denies c/p, abdominal pain, n/v/d. Reports some SOB.    LOC: The patient is awake, alert and aware of environment with an appropriate affect, the patient is oriented x 3 and speaking appropriately.  APPEARANCE: Patient in no acute distress, patient is clean and well groomed, patient's clothing is properly fastened.  SKIN: The skin is warm and dry, color consistent with ethnicity, patient has normal skin turgor and moist mucus membranes, skin intact, no breakdown or bruising noted.  MUSCULOSKELETAL: Patient moving all extremities spontaneously, no obvious swelling or deformities noted.  RESPIRATORY: Airway is open and patent, respirations are spontaneous, patient has a normal effort and rate, no accessory muscle use noted,  CARDIAC: Patient has irregular rate and rhythm, tachycardia, no periphreal edema noted, capillary refill < 3 seconds.  ABDOMEN: Soft and non tender to palpation, no distention noted,   NEUROLOGIC: Facial expression is symmetrical, patient moving all extremities spontaneously, normal sensation in all extremities when touched with a finger.  Follows all commands appropriately.

## 2024-09-18 NOTE — ASSESSMENT & PLAN NOTE
Patient with Paroxysmal (<7 days) atrial fibrillation with history of multiple PACs s/p ablation and mildly reduced systolic function no HF exacerbation..[id number] Score: 3. HASBLED Score: 1.     Recs:  - Patient converted to SR w/o intervention  - Ok to start patient on low dose toprol 12.5mg due to previous hx of bradycardia and recommend starting anticoagulation with eliquis 5mg BID.   - Patient can follow up with Dr. Verma as outpatient.   - Discussed with EP fellow and Dr. Jimenez.

## 2024-09-19 ENCOUNTER — PATIENT MESSAGE (OUTPATIENT)
Dept: CARDIOLOGY | Facility: CLINIC | Age: 69
End: 2024-09-19
Payer: COMMERCIAL

## 2024-09-19 ENCOUNTER — PATIENT MESSAGE (OUTPATIENT)
Dept: ELECTROPHYSIOLOGY | Facility: CLINIC | Age: 69
End: 2024-09-19
Payer: COMMERCIAL

## 2024-10-01 ENCOUNTER — TELEPHONE (OUTPATIENT)
Dept: PULMONOLOGY | Facility: CLINIC | Age: 69
End: 2024-10-01
Payer: COMMERCIAL

## 2024-10-01 NOTE — TELEPHONE ENCOUNTER
----- Message from Med Assistant Douglas sent at 10/1/2024 11:03 AM CDT -----  Patient need an appointment with Dr Rosario  ----- Message -----  From: Kelly Benítez  Sent: 10/1/2024  10:48 AM CDT  To: Abraham Patel Staff    Name of Who is calling :  PADMA FUNG [[id number]]      What is the request in detail:  Pt  would like a call back in regards to an appt. Please assist       Can the clinic reply by [facility]:no            What [facility] to [facility] back [facility] not [facility][phone]

## 2024-10-08 NOTE — PROGRESS NOTES
"Subjective:     HPI    I had the pleasure of seeing Teresa Dennis in follow-up for her history of ectopy. She is a 69F with seizure disorder who was well until 8/2023 when she began to note worsening fatigue. She was also receiving intermittent alarms on her AppleWatch that her HR was in the 30-40s bpm range. In 9/2023 she presented to the ED, was found to be in atrial bigeminy, and started on .Echo showed EF 55-60%, mod LAE.     Notably, pt was at work as a psych nurse. Two weeks ago she was feeling tired at work. Another nurse took vitals, which were notable for HR in the 30s bpm range.    A 48 hr Holter performed in 8/2023 showed sinus rhythm average HR 68 bpm (range  bpm), PAC burden 12.4%, PVC burden 2.2%, no sustained arrhythmias. Multiple HRs in the 30s bpm range occurred during the daytime.    Pt states she started [employer] in 8/2023 and has lost 10-14 lbs since then. Pt had COVID in 7/2023.    At 9/2023 visit HR 47 bpm--pt felt fine. My impression was that symptoms were 2/2 SSS as opposed to ectopy. Discussed pacemaker, but she deferred.    A 24 hr Holter in 11/2023 showed sinus rhythm average HR 82 bpm (range  bpm), PAC burden 27.3%, 5 runs of nonsustained AT longest 10 beats, PVC burden 0.4%, no sustained arrhythmias.    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 This was prompted by a stress echo notable for multiple periods of frequent ectopy and blocked PACs.    At 4/2024 visit pt stated she had been noting intermittent HENLEY, particularly with climbing stairs. Also intermittently feels "not focused".  Found to have PACs occurring in a bigeminy pattern.    On 7/16/2024 pt underwent PAC ablation (EUGENE superior septum). Sx improved post-ablation.    [place] 9[place]2024 [place] [facility] [place] indicated she was in AF. Found to be in AF at 136 bpm. Notably, pt had several alcoholic beverages the night prior. Administered 500cc fluid bolus and spontaneously converted to sinus rhythm. " Discharged on toprol xl 12.5 mg daily and eliquis.    Pt has AppleWatch, which has not indicated AF since this event. I reviewed today's ECG which shows sinus rhythm 79 bpm with no PACs.    Review of Systems   Constitutional: Positive for malaise/fatigue. Negative for decreased appetite, weight gain and weight loss.   HENT:  Negative for sore throat.    Eyes:  Negative for blurred vision.   Cardiovascular:  Negative for chest pain, dyspnea on exertion, irregular heartbeat, leg swelling, near-syncope, orthopnea, palpitations, paroxysmal nocturnal dyspnea and syncope.   Respiratory:  Negative for shortness of breath.    Skin:  Negative for rash.   Musculoskeletal:  Negative for arthritis.   Gastrointestinal:  Negative for abdominal pain.   Neurological:  Negative for focal weakness.   Psychiatric/Behavioral:  Negative for altered mental status.        Objective:   Physical Exam  Constitutional:       General: She is not in acute distress.     Appearance: She is well-developed.   HENT:      Head: Normocephalic and atraumatic.   Eyes:      General: No scleral icterus.     Conjunctiva/sclera: Conjunctivae normal.      Pupils: Pupils are equal, round, and reactive to light.   Neck:      Thyroid: No thyromegaly.      Vascular: No JVD.   Cardiovascular:      Rate and Rhythm: Normal rate and regular rhythm.      Pulses: Intact distal pulses.      Heart sounds: Normal heart sounds. No murmur heard.     No friction rub. No gallop.   Pulmonary:      Effort: Pulmonary effort is normal. No respiratory distress.      Breath sounds: Normal breath sounds.   Abdominal:      General: Bowel sounds are normal. There is no distension.      Palpations: Abdomen is soft.   Musculoskeletal:      Cervical back: Normal range of motion and neck supple.   Skin:     General: Skin is warm and dry.   Neurological:      Mental Status: She is alert and oriented to person, place, and time.   Psychiatric:         Behavior: Behavior normal.          Assessment:      1. PAF (paroxysmal atrial fibrillation)    2. Premature atrial contractions    3. Symptomatic bradycardia    4. Pure hypercholesterolemia        Plan:     In summary, Teresa Dennis is a 69F with a history of symptomatic bradycardia, at this point most likely 2/2 frequent blocked PACs (27% burden). Now status-post PAC ablation in 7/2024. PAF noted in 9/2024. Currently on eliquis and toprol xl 12.5 mg daily.    Given only a single episode of AF in the setting of excessive ETOH, and no further episodes noted on AppleWatch, plan is to stop eliquis. Stressed importance of lifestyle modifications including reducing ETOH intake. Follow-up 1 year.    Thank you for allowing me to participate in the care of this patient. Please do not hesitate to call me with any questions or concerns.  .

## 2024-10-14 ENCOUNTER — OFFICE VISIT (OUTPATIENT)
Dept: ELECTROPHYSIOLOGY | Facility: CLINIC | Age: 69
End: 2024-10-14
Payer: COMMERCIAL

## 2024-10-14 ENCOUNTER — HOSPITAL ENCOUNTER (OUTPATIENT)
Dept: CARDIOLOGY | Facility: CLINIC | Age: 69
Discharge: HOME | End: 2024-10-14
Payer: COMMERCIAL

## 2024-10-14 VITALS
BODY MASS INDEX: 29.3 KG/M2 | WEIGHT: 155.19 LBS | SYSTOLIC BLOOD PRESSURE: 131 MMHG | HEART RATE: 79 BPM | DIASTOLIC BLOOD PRESSURE: 80 MMHG | HEIGHT: 61 IN

## 2024-10-14 DIAGNOSIS — E78.00 PURE HYPERCHOLESTEROLEMIA: Chronic | ICD-10-CM

## 2024-10-14 DIAGNOSIS — I48.0 PAF (PAROXYSMAL ATRIAL FIBRILLATION): Primary | ICD-10-CM

## 2024-10-14 DIAGNOSIS — I49.8 OTHER SPECIFIED CARDIAC ARRHYTHMIAS: ICD-10-CM

## 2024-10-14 DIAGNOSIS — R00.1 SYMPTOMATIC BRADYCARDIA: Chronic | ICD-10-CM

## 2024-10-14 DIAGNOSIS — I49.1 PREMATURE ATRIAL CONTRACTIONS: Chronic | ICD-10-CM

## 2024-10-14 LAB
OHS QRS DURATION: 86 MS
OHS QTC CALCULATION: 449 MS

## 2024-10-14 PROCEDURE — 99999 PR PBB SHADOW E&M-EST. PATIENT-LVL III: CPT | Mod: PBBFAC,,, | Performed by: INTERNAL MEDICINE

## 2024-10-14 PROCEDURE — 1160F RVW MEDS BY RX/DR IN RCRD: CPT | Mod: CPTII,S$GLB,, | Performed by: INTERNAL MEDICINE

## 2024-10-14 PROCEDURE — 93010 ELECTROCARDIOGRAM REPORT: CPT | Mod: S$GLB,,, | Performed by: INTERNAL MEDICINE

## 2024-10-14 PROCEDURE — 1126F AMNT PAIN NOTED NONE PRSNT: CPT | Mod: CPTII,S$GLB,, | Performed by: INTERNAL MEDICINE

## 2024-10-14 PROCEDURE — 99214 OFFICE O/P EST MOD 30 MIN: CPT | Mod: S$GLB,,, | Performed by: INTERNAL MEDICINE

## 2024-10-14 PROCEDURE — 3079F DIAST BP 80-89 MM HG: CPT | Mod: CPTII,S$GLB,, | Performed by: INTERNAL MEDICINE

## 2024-10-14 PROCEDURE — 1101F PT FALLS ASSESS-DOCD LE1/YR: CPT | Mod: CPTII,S$GLB,, | Performed by: INTERNAL MEDICINE

## 2024-10-14 PROCEDURE — 93005 ELECTROCARDIOGRAM TRACING: CPT | Mod: S$GLB,,, | Performed by: INTERNAL MEDICINE

## 2024-10-14 PROCEDURE — 1159F MED LIST DOCD IN RCRD: CPT | Mod: CPTII,S$GLB,, | Performed by: INTERNAL MEDICINE

## 2024-10-14 PROCEDURE — 3075F SYST BP GE 130 - 139MM HG: CPT | Mod: CPTII,S$GLB,, | Performed by: INTERNAL MEDICINE

## 2024-10-14 PROCEDURE — 3288F FALL RISK ASSESSMENT DOCD: CPT | Mod: CPTII,S$GLB,, | Performed by: INTERNAL MEDICINE

## 2024-10-14 PROCEDURE — 3008F BODY MASS INDEX DOCD: CPT | Mod: CPTII,S$GLB,, | Performed by: INTERNAL MEDICINE

## 2024-10-29 ENCOUNTER — TELEPHONE (OUTPATIENT)
Dept: PULMONOLOGY | Facility: CLINIC | Age: 69
End: 2024-10-29
Payer: COMMERCIAL

## 2024-10-30 ENCOUNTER — TELEPHONE (OUTPATIENT)
Dept: PULMONOLOGY | Facility: CLINIC | Age: 69
End: 2024-10-30
Payer: COMMERCIAL

## 2024-11-05 ENCOUNTER — OFFICE VISIT (OUTPATIENT)
Dept: SLEEP MEDICINE | Facility: CLINIC | Age: 69
End: 2024-11-05
Payer: COMMERCIAL

## 2024-11-05 VITALS
SYSTOLIC BLOOD PRESSURE: 130 MMHG | HEIGHT: 61 IN | OXYGEN SATURATION: 95 % | HEART RATE: 71 BPM | WEIGHT: 152.75 LBS | BODY MASS INDEX: 28.84 KG/M2 | DIASTOLIC BLOOD PRESSURE: 76 MMHG

## 2024-11-05 DIAGNOSIS — G47.33 OSA (OBSTRUCTIVE SLEEP APNEA): Primary | ICD-10-CM

## 2024-11-05 PROCEDURE — 3288F FALL RISK ASSESSMENT DOCD: CPT | Mod: CPTII,S$GLB,, | Performed by: INTERNAL MEDICINE

## 2024-11-05 PROCEDURE — 99999 PR PBB SHADOW E&M-EST. PATIENT-LVL III: CPT | Mod: PBBFAC,,, | Performed by: INTERNAL MEDICINE

## 2024-11-05 PROCEDURE — 3008F BODY MASS INDEX DOCD: CPT | Mod: CPTII,S$GLB,, | Performed by: INTERNAL MEDICINE

## 2024-11-05 PROCEDURE — 3078F DIAST BP <80 MM HG: CPT | Mod: CPTII,S$GLB,, | Performed by: INTERNAL MEDICINE

## 2024-11-05 PROCEDURE — 1101F PT FALLS ASSESS-DOCD LE1/YR: CPT | Mod: CPTII,S$GLB,, | Performed by: INTERNAL MEDICINE

## 2024-11-05 PROCEDURE — 1159F MED LIST DOCD IN RCRD: CPT | Mod: CPTII,S$GLB,, | Performed by: INTERNAL MEDICINE

## 2024-11-05 PROCEDURE — 99214 OFFICE O/P EST MOD 30 MIN: CPT | Mod: S$GLB,,, | Performed by: INTERNAL MEDICINE

## 2024-11-05 PROCEDURE — 3075F SYST BP GE 130 - 139MM HG: CPT | Mod: CPTII,S$GLB,, | Performed by: INTERNAL MEDICINE

## 2024-11-05 PROCEDURE — 1160F RVW MEDS BY RX/DR IN RCRD: CPT | Mod: CPTII,S$GLB,, | Performed by: INTERNAL MEDICINE

## 2024-11-05 NOTE — PROGRESS NOTES
Subjective:   Patient ID: Teresa Dennis is a 69 y.o. female    Chief Complaint:   Chief Complaint   Patient presents with    Sleep Apnea       Referred by No ref. provider found    HPI  Teresa Dennis is a 69 y.o. female who was referred by No ref. provider found for a sleep evaluation for sleep disordered breathing The patient  has a past medical history of Premature atrial contractions, PVC (premature ventricular contraction), and Seizure disorder.     She presents today as follow up. Last seen in July 2024.  Teresa Dennis underwent a home sleep study on 4/18/2024 where the AHI was 51, the RDI was 59, oxygen alberto was 75.8%. The patient weight 149 lbs and had BMI of 28.    She is using cpap nightly. Benefiting from therapy. Sleeping better. Less diurnal fatigue.      PAP history   DME [facility] phone number- [phone]     Mask Airfit n20 - reports itchiness around nose   Pressure 5 - 20   Problems none   Machine age Issue 2024   Download Compliance:  Days used:   90/90  Days used > 4hrs:  79/90  Average use (hr:min):  6:51  Pressure:   14.0  Leak:    11.7  Residual AHI:   4.9         Patient does work as admin in [employer]    Sleep summary during the workdays per patient report:  Bedtime: 9 PM  Sleep Latency: 15 min  # Awakenings: 2-3  WASO (wakefulness after sleep onset) a few min  Risetime: 5 AM    Sleep summary during days off per patient report:  Bedtime: 9 PM  Sleep Latency: 15 min  # Awakenings: 2-3   WASO (wakefulness after sleep onset) a few min  Risetime: 6-7 AM    CONTRIBUTING and/or CONFOUNDING FACTORS:    Nocturnal pain:   n    Nocturia >2/night:   n  Heartburn/GI pain:   n  Environment:    n  Bed partner noise/movements : n            Most Recent Vital Signs:    The patient's body mass index is 28.87 kg/m².    Wt Readings from Last 5 Encounters:   11/05/24 69.3 kg (152 lb 12.5 oz)   10/14/24 70.4 kg (155 lb 3.3 oz)   09/18/24 68 kg (150 lb)   09/16/24  70.6 kg (155 lb 10.3 oz)   09/09/24 67.6 kg (149 lb)     BMI Readings from Last 5 Encounters:   11/05/24 28.87 kg/m²   10/14/24 29.33 kg/m²   09/18/24 28.34 kg/m²   09/16/24 29.41 kg/m²   09/09/24 28.15 kg/m²     Pulse Readings from Last 3 Encounters:   11/05/24 71   10/14/24 79   09/18/24 65         Current Outpatient Medications:     atorvastatin (LIPITOR) 20 MG tablet, TAKE 1 TABLET BY MOUTH EVERY DAY, Disp: 90 tablet, Rfl: 3    carBAMazepine (CARBATROL) 300 MG CM12, TAKE 1 CAPSULE BY MOUTH TWICE A DAY, Disp: 180 capsule, Rfl: 1    ergocalciferol (ERGOCALCIFEROL) 50,000 unit Cap, Take 1 capsule by mouth once a week., Disp: , Rfl:     famotidine (PEPCID AC ORAL), Take 1 tablet by mouth daily as needed (Heartburn)., Disp: , Rfl:     metoprolol succinate (TOPROL-XL) 25 MG 24 hr tablet, Take 0.5 tablets (12.5 mg total) by mouth once daily., Disp: 45 tablet, Rfl: 3    risedronate (ACTONEL) 35 MG tablet, Take 35 mg by mouth every 7 days., Disp: , Rfl:        ROS    Objective:   Vitals reviewed   Physical Exam  Vitals reviewed.   Constitutional:       Appearance: Normal appearance.   Pulmonary:      Effort: Pulmonary effort is normal.   Neurological:      General: No focal deficit present.      Mental Status: She is alert and oriented to person, place, and time.         Assessment:     Problem List Items Addressed This Visit          Other    TAYLOR (obstructive sleep apnea) - Primary    Relevant Orders    CPAP/BIPAP SUPPLIES           Plan:       Patient is on PAP therapy at this time  excellent Compliance  excellent Efficacy  Reports itchiness around nose - change mask to Airtouch N20  - Teaching in regards to PAP use and mask adjustment was given to the patient during the visit today.  - Use PAP >4hours per night for 7 nights per week  - Use PAP for any daytime sleeping  - Interface patient chose that was prescribed today: as above  - Engage in a weight loss program through diet and exercise plan  - Prescription for PAP  device supplies will be send to the [facility] today. Filter, mask, tube with climate line and humidification system.      Patient suffers from a complex medical condition and if sleep disordered breathing is not properly treated it will increase her morbidity and mortality and patient is aware that has to be optimally compliant with therapy. Sleep disordered breathing has been associated with uncontrolled hypertension, insulin resistance, pulmonary hypertension, dementia, glaucoma, cardiac arrhythmias, cerebro vascular accident and multiple other conditions when not treated appropriately. Patient benefits from PAP therapy.      - Additionally close attention to patient's past medical history as described below were discussed today during the office evaluation.   she  has a past medical history of Premature atrial contractions, PVC (premature ventricular contraction), and Seizure disorder.       RTC annually

## 2025-03-07 ENCOUNTER — PATIENT MESSAGE (OUTPATIENT)
Dept: SLEEP MEDICINE | Facility: CLINIC | Age: 70
End: 2025-03-07
Payer: COMMERCIAL

## 2025-03-07 DIAGNOSIS — G47.33 OSA (OBSTRUCTIVE SLEEP APNEA): Primary | ICD-10-CM

## 2025-03-10 ENCOUNTER — HOSPITAL ENCOUNTER (OUTPATIENT)
Dept: CARDIOLOGY | Facility: HOSPITAL | Age: 70
Discharge: HOME | End: 2025-03-10
Attending: INTERNAL MEDICINE
Payer: COMMERCIAL

## 2025-03-10 VITALS
WEIGHT: 152 LBS | HEART RATE: 60 BPM | DIASTOLIC BLOOD PRESSURE: 78 MMHG | HEIGHT: 61 IN | BODY MASS INDEX: 28.7 KG/M2 | SYSTOLIC BLOOD PRESSURE: 132 MMHG

## 2025-03-10 DIAGNOSIS — I42.0 DILATED CARDIOMYOPATHY: ICD-10-CM

## 2025-03-10 LAB
ASCENDING AORTA: 3.23 CM
AV AREA BY CONTINUOUS VTI: 2.8 CM2
AV INDEX (PROSTH): 0.89
AV LVOT MEAN GRADIENT: 3 MMHG
AV LVOT PEAK GRADIENT: 5 MMHG
AV MEAN GRADIENT: 4 MMHG
AV PEAK GRADIENT: 6 MMHG
AV VALVE AREA BY VELOCITY RATIO: 2.9 CM²
AV VALVE AREA: 2.8 CM2
AV VELOCITY RATIO: 0.92
BSA FOR ECHO PROCEDURE: 1.72 M2
CV ECHO LV RWT: 0.26 CM
DOP CALC AO PEAK VEL: 1.2 M/S
DOP CALC AO VTI: 30.8 CM
DOP CALC LVOT AREA: 3.1 CM2
DOP CALC LVOT DIAMETER: 2 CM
DOP CALC LVOT PEAK VEL: 1.1 M/S
DOP CALC LVOT STROKE VOLUME: 86 CM3
DOP CALC RVOT AREA: 3.02 CM2
DOP CALC RVOT DIAMETER: 1.96 CM
DOP CALCLVOT PEAK VEL VTI: 27.4 CM
E WAVE DECELERATION TIME: 245 MS
E/A RATIO: 1.14
E/E' RATIO: 8 M/S
ECHO EF ESTIMATED: 61 %
ECHO LV POSTERIOR WALL: 0.7 CM (ref 0.6–1.1)
FRACTIONAL SHORTENING: 32.1 % (ref 28–44)
HR MV ECHO: 60 BPM
INTERVENTRICULAR SEPTUM: 0.7 CM (ref 0.6–1.1)
IVC DIAMETER: 0.95 CM
LA MAJOR: 4.8 CM
LA MINOR: 4.4 CM
LA WIDTH: 3.9 CM
LEFT ATRIUM SIZE: 3.8 CM
LEFT ATRIUM VOLUME INDEX MOD: 30 ML/M2
LEFT ATRIUM VOLUME INDEX: 34 ML/M2
LEFT ATRIUM VOLUME MOD: 51 ML
LEFT ATRIUM VOLUME: 58 CM3
LEFT INTERNAL DIMENSION IN SYSTOLE: 3.6 CM (ref 2.1–4)
LEFT VENTRICLE DIASTOLIC VOLUME INDEX: 80.36 ML/M2
LEFT VENTRICLE DIASTOLIC VOLUME: 135 ML
LEFT VENTRICLE MASS INDEX: 75.6 G/M2
LEFT VENTRICLE SYSTOLIC VOLUME INDEX: 31.5 ML/M2
LEFT VENTRICLE SYSTOLIC VOLUME: 53 ML
LEFT VENTRICULAR INTERNAL DIMENSION IN DIASTOLE: 5.3 CM (ref 3.5–6)
LEFT VENTRICULAR MASS: 127 G
LV LATERAL E/E' RATIO: 6.4
LV SEPTAL E/E' RATIO: 11.6
MV A" WAVE DURATION": 156.04 MS
MV MEAN GRADIENT: 1 MMHG
MV PEAK A VEL: 0.51 M/S
MV PEAK E VEL: 0.58 M/S
MV PEAK GRADIENT: 2 MMHG
OHS CV RV/LV RATIO: 0.57 CM
PISA TR MAX VEL: 2.8 M/S
PULM VEIN A" WAVE DURATION": 156.04 MS
PULM VEIN S/D RATIO: 1.05
PULMONIC VEIN PEAK A VELOCITY: 0.3 M/S
PV PEAK D VEL: 0.41 M/S
PV PEAK S VEL: 0.43 M/S
RA MAJOR: 4.23 CM
RA PRESSURE ESTIMATED: 3 MMHG
RA WIDTH: 3.51 CM
RIGHT ATRIAL AREA: 12.2 CM2
RIGHT VENTRICLE DIASTOLIC BASEL DIMENSION: 3 CM
RV TB RVSP: 6 MMHG
RV TISSUE DOPPLER FREE WALL SYSTOLIC VELOCITY 1 (APICAL 4 CHAMBER VIEW): 9.65 CM/S
SINUS: 2.97 CM
STJ: 2.82 CM
TDI LATERAL: 0.09 M/S
TDI SEPTAL: 0.05 M/S
TDI: 0.07 M/S
TRICUSPID ANNULAR PLANE SYSTOLIC EXCURSION: 1.95 CM
TV PEAK GRADIENT: 33 MMHG
TV REST PULMONARY ARTERY PRESSURE: 34 MMHG
Z-SCORE OF LEFT VENTRICULAR DIMENSION IN END DIASTOLE: 1.23
Z-SCORE OF LEFT VENTRICULAR DIMENSION IN END SYSTOLE: 1.7

## 2025-03-10 PROCEDURE — 93306 TTE W/DOPPLER COMPLETE: CPT

## 2025-03-10 PROCEDURE — 93306 TTE W/DOPPLER COMPLETE: CPT | Mod: 26,,, | Performed by: INTERNAL MEDICINE

## 2025-03-21 ENCOUNTER — OFFICE VISIT (OUTPATIENT)
Dept: CARDIOLOGY | Facility: CLINIC | Age: 70
End: 2025-03-21
Payer: COMMERCIAL

## 2025-03-21 VITALS
BODY MASS INDEX: 30.06 KG/M2 | SYSTOLIC BLOOD PRESSURE: 110 MMHG | HEART RATE: 54 BPM | WEIGHT: 159.19 LBS | DIASTOLIC BLOOD PRESSURE: 71 MMHG | HEIGHT: 61 IN

## 2025-03-21 DIAGNOSIS — R00.1 SYMPTOMATIC BRADYCARDIA: Chronic | ICD-10-CM

## 2025-03-21 DIAGNOSIS — E78.2 MIXED HYPERLIPIDEMIA: ICD-10-CM

## 2025-03-21 DIAGNOSIS — I48.0 PAF (PAROXYSMAL ATRIAL FIBRILLATION): Primary | ICD-10-CM

## 2025-03-21 DIAGNOSIS — G47.33 OSA (OBSTRUCTIVE SLEEP APNEA): ICD-10-CM

## 2025-03-21 DIAGNOSIS — I49.1 PREMATURE ATRIAL CONTRACTIONS: Chronic | ICD-10-CM

## 2025-03-21 PROCEDURE — 99999 PR PBB SHADOW E&M-EST. PATIENT-LVL III: CPT | Mod: PBBFAC,,, | Performed by: INTERNAL MEDICINE

## 2025-03-21 NOTE — PROGRESS NOTES
Subjective:   2025     Patient ID:  Teresa Dennis is a 69 y.o. female who presents for evaulation of Hyperlipidemia and Atrial Fibrillation        Patient comes in for follow-up, no problems after cardiac catheterization, no chest pains or tightness, no PND or orthopnea.  Underwent a PAC ablation, seems to have worked quite well.  Repeat echocardiography recently shows mildly improved systolic function.      Prior cardiac catheterization did not show significant coronary disease.      She does have sleep apnea, currently trying to tolerate CPAP.  Will follow-up with sleep medicine.    Blood pressure appears to be well controlled, not on meds accept low-dose metoprolol.          Prior note reviewed:  Patient did develop increasing shortness of breath recently, no chest pains or tightness.  Had a stress echo showing decreased left ventricular systolic function at baseline without normal augmentation to hyperdynamic function.  A exercise treadmill test showed bradycardia at rest due to blocked PACs.  She was able to increase her heart rate into normal ranges with exercise.    She does have PACs/PVCs and dyslipidemia.  Did episode symptomatic bradycardia several weeks ago, Holter monitor showed very frequent PACs and PVCs.  She continues to be symptomatic and was seen by EP, felt that she was having symptomatic bradycardia, permanent pacemaker was recommended, she is feeling better now and, will follow-up after another Holter with EP.    She does have positive family history for congestive heart failure.  Hypercholesterolemia is treated with moderate dose statin therapy.  Lipid profile in  shows total cholesterol 188, HDL 57, , triglycerides 79.     Her father had AF and  of CHF.     She only smoked in high school.    Subsequent stress echo:     ·  Left Ventricle: Mild global hypokinesis present. There is mildly reduced systolic function with a visually estimated ejection fraction of 45 - 50%.  ·   Right Ventricle: Normal right ventricular cavity size. Wall thickness is normal. Right ventricle wall motion  is normal. Systolic function is normal.  ·  Tricuspid Valve: There is mild regurgitation.  ·  Pulmonary Artery: There is mild pulmonary hypertension. The estimated pulmonary artery systolic pressure is 43 mmHg.  ·  IVC/SVC: Normal venous pressure at 3 mmHg.  ·  Stress Protocol: The patient exercised for 11 minutes 30 seconds on a medium ramp protocol, corresponding to a functional capacity of 7 METS, achieving a peak heart rate of 134 bpm, which is 91 % of the age predicted maximum heart rate. Their exercise capacity was above average. The patient reported no symptoms during the stress test. The test was stopped because the end of the protocol was reached.  ·  Baseline ECG: The Baseline ECG reveals sinus rhythm. The axis is normal. The ST segments are normal.  ·  Stress ECG: There are no ST segment deviation identified during the protocol. During stress, frequent PACs are noted. During stress, frequent PVCs are noted. There is normal blood pressure response with stress.  ·  ECG Conclusion: The ECG portion of the study is negative for ischemia.  ·  Post-stress Echo: The left ventricle systolic function is normal.  ·  Post-stress Impression: The study is negative with no echocardiographic evidence of stress induced ischemia.       Abnormal stress echocardiogram, left ventricular systolic function at rest is mildly decreased.  This does appear to normalized with exercise, exercise capacity is above average.     Frequent PACs and PVCs are present throughout the study.    Cardiac catheterization:  ·  The estimated blood loss was <50 mL.  ·  The coronary arteries were normal..     Patient with abnormal stress echocardiogram, very frequent PACs and PVCs.  Cardiac catheterization today though shows normal coronary arteries.  She should follow-up with EP, does have fairly severe bradycardia.  This may be related to  blocked PACs as noted on her stress test.                    Past Medical History:   Diagnosis Date    Premature atrial contractions     PVC (premature ventricular contraction)     Seizure disorder        Review of patient's allergies indicates:  No Known Allergies      Current Outpatient Medications:     atorvastatin (LIPITOR) 20 MG tablet, TAKE 1 TABLET BY MOUTH EVERY DAY, Disp: 90 tablet, Rfl: 3    carBAMazepine (CARBATROL) 300 MG CM12, TAKE 1 CAPSULE BY MOUTH TWICE A DAY, Disp: 180 capsule, Rfl: 1    ergocalciferol (ERGOCALCIFEROL) 50,000 unit Cap, Take 1 capsule by mouth once a week., Disp: , Rfl:     famotidine (PEPCID AC ORAL), Take 1 tablet by mouth daily as needed (Heartburn)., Disp: , Rfl:     metoprolol succinate (TOPROL-XL) 25 MG 24 hr tablet, Take 0.5 tablets (12.5 mg total) by mouth once daily., Disp: 45 tablet, Rfl: 3    risedronate (ACTONEL) 35 MG tablet, Take 35 mg by mouth every 7 days., Disp: , Rfl:      Objective:   Review of Systems   Cardiovascular:  Negative for chest pain, claudication, cyanosis, dyspnea on exertion, irregular heartbeat, leg swelling, near-syncope, orthopnea, palpitations, paroxysmal nocturnal dyspnea and syncope.       Vitals:    03/21/25 0919   BP: 110/71   Pulse: (!) 54         Physical Exam  Vitals reviewed.   Constitutional:       General: She is not in acute distress.     Appearance: She is well-developed.   HENT:      Head: Normocephalic and atraumatic.      Nose: Nose normal.   Eyes:      Conjunctiva/sclera: Conjunctivae normal.      Pupils: Pupils are equal, round, and reactive to light.   Neck:      Vascular: No JVD.   Cardiovascular:      Rate and Rhythm: Normal rate. Rhythm irregular.      Pulses: Intact distal pulses.      Heart sounds: Normal heart sounds. No murmur heard.     No friction rub. No gallop.   Pulmonary:      Effort: Pulmonary effort is normal. No respiratory distress.      Breath sounds: Normal breath sounds. No wheezing or rales.   Chest:       Chest wall: No tenderness.   Abdominal:      General: Bowel sounds are normal. There is no distension.      Palpations: Abdomen is soft.      Tenderness: There is no abdominal tenderness.   Musculoskeletal:         General: No tenderness or deformity. Normal range of motion.      Cervical back: Normal range of motion and neck supple.   Skin:     General: Skin is warm and dry.      Findings: No erythema or rash.   Neurological:      Mental Status: She is alert and oriented to person, place, and time.      Cranial Nerves: No cranial nerve deficit.      Motor: No abnormal muscle tone.      Coordination: Coordination normal.   Psychiatric:         Behavior: Behavior normal.         Thought Content: Thought content normal.         Judgment: Judgment normal.                       Assessment and Plan:     PAF (paroxysmal atrial fibrillation)  Comments:  Not recurrent, resolve    Premature atrial contractions  Comments:  Resolved after ablation    Mixed hyperlipidemia  Comments:  Moderate dose statin therapy, PCP    Symptomatic bradycardia  Comments:  Improved with lower dose beta-blocker    TAYLOR (obstructive sleep apnea)  Comments:  Continues on CPAP                  Follow up in about 1 year (around 3/21/2026).

## 2025-04-06 ENCOUNTER — PATIENT MESSAGE (OUTPATIENT)
Dept: CARDIOLOGY | Facility: CLINIC | Age: 70
End: 2025-04-06
Payer: COMMERCIAL

## 2025-04-07 DIAGNOSIS — I48.0 PAROXYSMAL ATRIAL FIBRILLATION: Primary | ICD-10-CM

## (undated) DEVICE — CATH RADIAL TIG 6FR 4.0 110CM

## (undated) DEVICE — PAD DEFIB CADENCE ADULT R2

## (undated) DEVICE — CATH THERMOCOOL SMTCH SF D F

## (undated) DEVICE — KIT GLIDESHEATH SLEND 6FR 10CM

## (undated) DEVICE — KIT CUSTOM MANIFOLD

## (undated) DEVICE — DRAPE ANGIO BRACH 38X44IN

## (undated) DEVICE — R CATH RESPONS QPLR JSN 6F 120

## (undated) DEVICE — CATH PENTARY F 2-6-2MM 115CM

## (undated) DEVICE — SPIKE SHORT LG BORE 1-WAY 2IN

## (undated) DEVICE — SHEATH HEMOSTASIS 8.5FR

## (undated) DEVICE — R CATH BIDIRECTIONL DF CRV 7FR

## (undated) DEVICE — TRANSDUCER ADULT DISP

## (undated) DEVICE — SET SMARTABLATE IRR TUBE

## (undated) DEVICE — INTRODUCER HEMOSTASIS 7.5F

## (undated) DEVICE — HEMOSTAT VASC BAND REG 24CM

## (undated) DEVICE — TRAY CATH LAB OMC

## (undated) DEVICE — LINE PRESSURE MONITORING 96IN

## (undated) DEVICE — ELECTRODE REM PLYHSV RETURN 9

## (undated) DEVICE — OMNIPAQUE 350MG 150ML VIAL

## (undated) DEVICE — PACK EP DRAPE OMC

## (undated) DEVICE — GUIDEWIRE EMERALD .035IN 260CM

## (undated) DEVICE — INTRO 8.5FR 63CM SRO

## (undated) DEVICE — PATCH CARTO REFERENCE